# Patient Record
Sex: FEMALE | Race: WHITE | NOT HISPANIC OR LATINO | URBAN - METROPOLITAN AREA
[De-identification: names, ages, dates, MRNs, and addresses within clinical notes are randomized per-mention and may not be internally consistent; named-entity substitution may affect disease eponyms.]

---
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
## 2022-03-21 ENCOUNTER — TELEPHONE (OUTPATIENT)
Dept: FAMILY MEDICINE | Facility: CLINIC | Age: 22
End: 2022-03-21

## 2022-03-21 ENCOUNTER — OFFICE VISIT (OUTPATIENT)
Dept: FAMILY MEDICINE | Facility: CLINIC | Age: 22
End: 2022-03-21
Payer: COMMERCIAL

## 2022-03-21 VITALS
DIASTOLIC BLOOD PRESSURE: 60 MMHG | WEIGHT: 118.5 LBS | BODY MASS INDEX: 18.84 KG/M2 | SYSTOLIC BLOOD PRESSURE: 100 MMHG | HEART RATE: 82 BPM | OXYGEN SATURATION: 97 %

## 2022-03-21 DIAGNOSIS — Z11.1 SCREENING EXAMINATION FOR PULMONARY TUBERCULOSIS: Primary | ICD-10-CM

## 2022-03-21 DIAGNOSIS — F41.9 ANXIETY: ICD-10-CM

## 2022-03-21 PROCEDURE — 99213 OFFICE O/P EST LOW 20 MIN: CPT | Performed by: STUDENT IN AN ORGANIZED HEALTH CARE EDUCATION/TRAINING PROGRAM

## 2022-03-21 PROCEDURE — 86580 TB INTRADERMAL TEST: CPT | Performed by: STUDENT IN AN ORGANIZED HEALTH CARE EDUCATION/TRAINING PROGRAM

## 2022-03-21 RX ORDER — VENLAFAXINE HYDROCHLORIDE 75 MG/1
75 CAPSULE, EXTENDED RELEASE ORAL DAILY
Qty: 90 CAPSULE | Refills: 3 | Status: SHIPPED | OUTPATIENT
Start: 2022-03-21 | End: 2022-10-07

## 2022-03-21 NOTE — PROGRESS NOTES
Assessment and Plan     21-year-old female with possible history of anxiety who presents with request for TB screening test for work and needing a refill on her venlafaxine.    3. Anxiety  - venlafaxine (EFFEXOR-XR) 75 MG 24 hr capsule; Take 1 capsule (75 mg) by mouth daily  Dispense: 90 capsule; Refill: 3    4. Screening examination for pulmonary tuberculosis  - TB INTRADERMAL TEST    Follow up: PRN  Options for treatment and follow-up care were reviewed with the patient and/or guardian. Oksana Fraga and/or guardian engaged in the decision making process and verbalized understanding of the options discussed and agreed with the final plan.    Dr. 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  Needs a TB test before doing this.  She also is wondering if I can refill her venlafaxine she takes for anxiety.  States this is working well with for her and she has no concern for side effects         PMHX:     Patient Active Problem List   Diagnosis     Anxiety       Current Outpatient Medications   Medication Sig Dispense Refill     norgestim-eth estrad triphasic (ORTHO TRI-CYCLEN) 0.18/0.215/0.25 MG-35 MCG tablet Take 1 tablet by mouth daily 84 tablet 3     tretinoin (RETIN-A) 0.01 % external gel Apply topically At Bedtime 45 g 3     venlafaxine (EFFEXOR-XR) 75 MG 24 hr capsule Take 1 capsule (75 mg) by mouth daily 90 capsule 1       Social History     Tobacco Use     Smoking status: Never Smoker     Smokeless tobacco: Never Used   Substance Use Topics     Alcohol use: Never     Drug use: Never       Social History     Social History Narrative     Not on file       Allergies   Allergen Reactions     Amoxicillin      Sulfamethoxazole-Trimethoprim      Sulfamethoxazole-Trimethoprim [Sulfamethoxazole W/Trimethoprim] Unknown     Erythromycin  Unknown and Rash     Penicillins Unknown and Rash       No results found for this or any previous visit (from the past 24 hour(s)).         Review of Systems:    ROS: 10 point ROS neg other than the symptoms noted above in the HPI.         Physical Exam:     Vitals:    03/21/22 1709   BP: 100/60   BP Location: Left arm   Patient Position: Sitting   Cuff Size: Adult Regular   Pulse: 82   SpO2: 97%   Weight: 53.8 kg (118 lb 8 oz)     Body mass index is 18.84 kg/m .    General appearance: Alert, cooperative, no distress, appears stated age  Head: Normocephalic, atraumatic, without obvious abnormality  Eyes: Pupils equal round, reactive.  Conjunctiva clear.  Nose: Nares normal, no drainage.  Throat: Lips, mucosa, tongue normal mucosa pink and moist  Neck: Supple, symmetric, trachea midline

## 2022-03-21 NOTE — TELEPHONE ENCOUNTER
Patient had tb test today and needs to come back for tb reading on nurse schedule on Thursday morning or early afternoon before 5 pm. Patient was given number to clinic to call and get it scheduled as no time slots were open and I don't have capability to create a time slot. Please call if she hasn't made appointment and assist her.

## 2022-03-24 ENCOUNTER — ALLIED HEALTH/NURSE VISIT (OUTPATIENT)
Dept: FAMILY MEDICINE | Facility: CLINIC | Age: 22
End: 2022-03-24
Payer: COMMERCIAL

## 2022-03-24 DIAGNOSIS — Z11.1 SCREENING EXAMINATION FOR PULMONARY TUBERCULOSIS: Primary | ICD-10-CM

## 2022-03-24 LAB
PPDINDURATION: 0 MM (ref 0–4.99)
PPDREDNESS: NORMAL

## 2022-03-24 PROCEDURE — 99207 PR NO CHARGE LOS: CPT | Mod: 25

## 2022-04-02 ENCOUNTER — HEALTH MAINTENANCE LETTER (OUTPATIENT)
Age: 22
End: 2022-04-02

## 2022-04-11 DIAGNOSIS — Z11.1 SCREENING EXAMINATION FOR PULMONARY TUBERCULOSIS: Primary | ICD-10-CM

## 2022-04-15 ENCOUNTER — LAB (OUTPATIENT)
Dept: LAB | Facility: CLINIC | Age: 22
End: 2022-04-15
Payer: COMMERCIAL

## 2022-04-15 DIAGNOSIS — Z11.1 SCREENING EXAMINATION FOR PULMONARY TUBERCULOSIS: ICD-10-CM

## 2022-04-15 PROCEDURE — 36415 COLL VENOUS BLD VENIPUNCTURE: CPT

## 2022-04-15 PROCEDURE — 86481 TB AG RESPONSE T-CELL SUSP: CPT

## 2022-04-17 LAB
GAMMA INTERFERON BACKGROUND BLD IA-ACNC: 0.03 IU/ML
M TB IFN-G BLD-IMP: NEGATIVE
M TB IFN-G CD4+ BCKGRND COR BLD-ACNC: 9.97 IU/ML
MITOGEN IGNF BCKGRD COR BLD-ACNC: 0 IU/ML
MITOGEN IGNF BCKGRD COR BLD-ACNC: 0.06 IU/ML
QUANTIFERON MITOGEN: 10 IU/ML
QUANTIFERON NIL TUBE: 0.03 IU/ML
QUANTIFERON TB1 TUBE: 0.09 IU/ML
QUANTIFERON TB2 TUBE: 0.03

## 2022-07-08 ENCOUNTER — HOSPITAL ENCOUNTER (EMERGENCY)
Facility: HOSPITAL | Age: 22
Discharge: HOME | End: 2022-07-09
Attending: EMERGENCY MEDICINE
Payer: MEDICAID

## 2022-07-08 DIAGNOSIS — S60.221A CONTUSION OF RIGHT HAND, INITIAL ENCOUNTER: ICD-10-CM
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
## 2022-07-08 DIAGNOSIS — S40.021A CONTUSION OF RIGHT UPPER ARM, INITIAL ENCOUNTER: ICD-10-CM
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
## 2022-07-08 DIAGNOSIS — S20.212A CONTUSION OF RIB ON LEFT SIDE, INITIAL ENCOUNTER: ICD-10-CM

## 2022-07-08 PROCEDURE — 99284 EMERGENCY DEPT VISIT MOD MDM: CPT | Mod: 25

## 2022-07-08 RX ORDER — AMOXICILLIN AND CLAVULANATE POTASSIUM 875; 125 MG/1; MG/1
1 TABLET, FILM COATED ORAL
Status: COMPLETED | OUTPATIENT
Start: 2022-07-08 | End: 2022-07-09

## 2022-07-08 RX ORDER — HYDROCODONE BITARTRATE AND ACETAMINOPHEN 5; 325 MG/1; MG/1
1 TABLET ORAL
Status: COMPLETED | OUTPATIENT
Start: 2022-07-08 | End: 2022-07-09

## 2022-07-09 VITALS
HEART RATE: 65 BPM | RESPIRATION RATE: 18 BRPM | BODY MASS INDEX: 19.02 KG/M2 | HEIGHT: 60 IN | SYSTOLIC BLOOD PRESSURE: 122 MMHG | DIASTOLIC BLOOD PRESSURE: 72 MMHG | WEIGHT: 96.88 LBS | TEMPERATURE: 98 F | OXYGEN SATURATION: 100 %

## 2022-07-09 PROCEDURE — 25000003 PHARM REV CODE 250: Performed by: NURSE PRACTITIONER

## 2022-07-09 RX ORDER — DICLOFENAC SODIUM 50 MG/1
50 TABLET, DELAYED RELEASE ORAL 3 TIMES DAILY PRN
Qty: 15 TABLET | Refills: 0 | Status: SHIPPED | OUTPATIENT
Start: 2022-07-09

## 2022-07-09 RX ORDER — TRAMADOL HYDROCHLORIDE 50 MG/1
50 TABLET ORAL EVERY 8 HOURS PRN
Qty: 12 TABLET | Refills: 0 | Status: SHIPPED | OUTPATIENT
Start: 2022-07-09 | End: 2022-07-13

## 2022-07-09 RX ORDER — AMOXICILLIN AND CLAVULANATE POTASSIUM 875; 125 MG/1; MG/1
1 TABLET, FILM COATED ORAL 2 TIMES DAILY
Qty: 14 TABLET | Refills: 0 | Status: SHIPPED | OUTPATIENT
Start: 2022-07-09

## 2022-07-09 RX ADMIN — HYDROCODONE BITARTRATE AND ACETAMINOPHEN 1 TABLET: 5; 325 TABLET ORAL at 12:07

## 2022-07-09 RX ADMIN — AMOXICILLIN AND CLAVULANATE POTASSIUM 1 TABLET: 875; 125 TABLET, FILM COATED ORAL at 12:07
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
## 2022-09-15 DIAGNOSIS — L70.0 ACNE VULGARIS: ICD-10-CM

## 2022-09-15 RX ORDER — TRETINOIN 0.1 MG/G
GEL TOPICAL
Qty: 45 G | Refills: 3 | Status: SHIPPED | OUTPATIENT
Start: 2022-09-15 | End: 2022-10-07

## 2022-09-15 NOTE — TELEPHONE ENCOUNTER
"Last Written Prescription Date:  11/2/21  Last Fill Quantity: 45 g,  # refills: 3   Last office visit provider:  3/21/22     Requested Prescriptions   Pending Prescriptions Disp Refills     tretinoin (RETIN-A) 0.01 % external gel [Pharmacy Med Name: TRETINOIN 0.01% GEL 45GM] 45 g 3     Sig: APPLY TOPICALLY TO THE AFFECTED AREA AT BEDTIME       Topical Acne Medications Protocol Passed - 9/15/2022 12:52 AM        Passed - Patient is 12 years of age or older        Passed - Recent (12 mo) or future (30 days) visit within the authorizing provider's specialty     Patient has had an office visit with the authorizing provider or a provider within the authorizing providers department within the previous 12 mos or has a future within next 30 days. See \"Patient Info\" tab in inbasket, or \"Choose Columns\" in Meds & Orders section of the refill encounter.              Passed - Medication is active on med list             Juliane Santana RN 09/15/22 1:54 PM  "

## 2022-10-01 ENCOUNTER — HEALTH MAINTENANCE LETTER (OUTPATIENT)
Age: 22
End: 2022-10-01

## 2022-10-07 ENCOUNTER — OFFICE VISIT (OUTPATIENT)
Dept: FAMILY MEDICINE | Facility: CLINIC | Age: 22
End: 2022-10-07
Payer: COMMERCIAL

## 2022-10-07 VITALS
DIASTOLIC BLOOD PRESSURE: 66 MMHG | OXYGEN SATURATION: 99 % | HEART RATE: 97 BPM | BODY MASS INDEX: 18.65 KG/M2 | SYSTOLIC BLOOD PRESSURE: 100 MMHG | RESPIRATION RATE: 20 BRPM | WEIGHT: 117.3 LBS

## 2022-10-07 DIAGNOSIS — F41.9 ANXIETY: Primary | ICD-10-CM

## 2022-10-07 DIAGNOSIS — Z11.3 SCREEN FOR STD (SEXUALLY TRANSMITTED DISEASE): ICD-10-CM

## 2022-10-07 DIAGNOSIS — L70.0 ACNE VULGARIS: ICD-10-CM

## 2022-10-07 DIAGNOSIS — Z30.09 BIRTH CONTROL COUNSELING: ICD-10-CM

## 2022-10-07 DIAGNOSIS — Z11.59 NEED FOR HEPATITIS C SCREENING TEST: ICD-10-CM

## 2022-10-07 DIAGNOSIS — Z11.4 SCREENING FOR HIV (HUMAN IMMUNODEFICIENCY VIRUS): ICD-10-CM

## 2022-10-07 PROCEDURE — 99214 OFFICE O/P EST MOD 30 MIN: CPT | Performed by: NURSE PRACTITIONER

## 2022-10-07 PROCEDURE — 36415 COLL VENOUS BLD VENIPUNCTURE: CPT | Performed by: NURSE PRACTITIONER

## 2022-10-07 PROCEDURE — 87491 CHLMYD TRACH DNA AMP PROBE: CPT | Performed by: NURSE PRACTITIONER

## 2022-10-07 PROCEDURE — 87591 N.GONORRHOEAE DNA AMP PROB: CPT | Performed by: NURSE PRACTITIONER

## 2022-10-07 PROCEDURE — 87389 HIV-1 AG W/HIV-1&-2 AB AG IA: CPT | Performed by: NURSE PRACTITIONER

## 2022-10-07 PROCEDURE — 86803 HEPATITIS C AB TEST: CPT | Performed by: NURSE PRACTITIONER

## 2022-10-07 RX ORDER — VENLAFAXINE HYDROCHLORIDE 75 MG/1
75 CAPSULE, EXTENDED RELEASE ORAL DAILY
Qty: 90 CAPSULE | Refills: 3 | Status: SHIPPED | OUTPATIENT
Start: 2022-10-07 | End: 2023-04-03

## 2022-10-07 RX ORDER — TRETINOIN 0.1 MG/G
GEL TOPICAL
Qty: 45 G | Refills: 3 | Status: SHIPPED | OUTPATIENT
Start: 2022-10-07 | End: 2023-07-10

## 2022-10-07 RX ORDER — NORGESTIMATE AND ETHINYL ESTRADIOL 7DAYSX3 28
1 KIT ORAL DAILY
Qty: 84 TABLET | Refills: 3 | Status: SHIPPED | OUTPATIENT
Start: 2022-10-07 | End: 2023-09-18

## 2022-10-07 ASSESSMENT — PAIN SCALES - GENERAL: PAINLEVEL: NO PAIN (0)

## 2022-10-07 NOTE — PROGRESS NOTES
Assessment and Plan:     Anxiety  This is controlled.  She continues venlafaxine.  - venlafaxine (EFFEXOR XR) 75 MG 24 hr capsule  Dispense: 90 capsule; Refill: 3    Acne vulgaris  She continues Retin-A for acne.  - tretinoin (RETIN-A) 0.01 % external gel  Dispense: 45 g; Refill: 3    Birth control counseling  Renewed OCP.  Educated on its indications and side effects including increased risk of blood clots.  We discussed the risk of increased blood clots with smoking.  I encourage smoking cessation.  Offered smoking cessation treatment options, but she declines.  - norgestim-eth estrad triphasic (ORTHO TRI-CYCLEN) 0.18/0.215/0.25 MG-35 MCG tablet  Dispense: 84 tablet; Refill: 3    Screen for STD (sexually transmitted disease)  Discussed safe sex practices.  Will notify patient of results.  - Chlamydia trachomatis PCR - Clinic Collect  - Neisseria gonorrhoeae PCR - Clinic Collect    Screening for HIV (human immunodeficiency virus)  - HIV Antigen Antibody Combo  - HIV Antigen Antibody Combo    Need for hepatitis C screening test  - Hepatitis C Screen Reflex to HCV RNA Quant and Genotype  - Hepatitis C Screen Reflex to HCV RNA Quant and Genotype    She is due for fasting physical with Pap in May.    Subjective:     Oksana is a 22 year old female presenting to the clinic for medication management.  Patient is taking venlafaxine XR 75 mg daily for anxiety.  She is tolerating the medication without any side effects.  Patient used to experience anxiety prior to presentations and social events.  She felt shaky and had difficulty breathing.  She felt lightheaded.  Overall, she feels as her symptoms are managed with the venlafaxine.  She denies thoughts of suicide.  Patient uses Retin-A for acne.  She requests renewal of oral contraceptive.  She is single and is not currently sexually active.  She has no concerns for STDs.  She is due for her menstrual period today.  She denies any personal family history of blood clots.   She does vape and occasionally smokes cigarettes.    Reviewof Systems: A complete 14 point review of systems was obtained and is negative or as stated in the history of present illness.    Social History     Socioeconomic History     Marital status: Single     Spouse name: Not on file     Number of children: Not on file     Years of education: Not on file     Highest education level: Not on file   Occupational History     Not on file   Tobacco Use     Smoking status: Current Some Day Smoker     Smokeless tobacco: Never Used   Vaping Use     Vaping Use: Every day     Substances: Nicotine     Devices: Disposable   Substance and Sexual Activity     Alcohol use: Yes     Comment: rare     Drug use: Yes     Types: Marijuana     Sexual activity: Never     Comment: single    Other Topics Concern     Not on file   Social History Narrative     Not on file     Social Determinants of Health     Financial Resource Strain: Not on file   Food Insecurity: Not on file   Transportation Needs: Not on file   Physical Activity: Not on file   Stress: Not on file   Social Connections: Not on file   Intimate Partner Violence: Not on file   Housing Stability: Not on file       Active Ambulatory Problems     Diagnosis Date Noted     Anxiety 10/22/2019     Resolved Ambulatory Problems     Diagnosis Date Noted     No Resolved Ambulatory Problems     No Additional Past Medical History       Family History   Problem Relation Age of Onset     Anemia Mother      No Known Problems Father      Heart Disease Maternal Grandfather        Objective:     /66 (BP Location: Right arm, Patient Position: Sitting)   Pulse 97   Resp 20   Wt 53.2 kg (117 lb 4.8 oz)   SpO2 99%   BMI 18.65 kg/m      Patient is alert, in no obvious distress.   Skin: Warm, dry.    Lungs:  Clear to auscultation. Respirations even and unlabored.  No wheezing or rales noted.   Heart:  Regular rate and rhythm.  No murmurs, S3, S4, gallops, or rubs.    Abdomen: Soft,  nontender.  No organomegaly. Bowel sounds normoactive. No guarding or masses noted.

## 2022-10-08 LAB
C TRACH DNA SPEC QL NAA+PROBE: NEGATIVE
N GONORRHOEA DNA SPEC QL NAA+PROBE: NEGATIVE

## 2022-10-09 LAB
HCV AB SERPL QL IA: NONREACTIVE
HIV 1+2 AB+HIV1 P24 AG SERPL QL IA: NONREACTIVE

## 2023-03-21 DIAGNOSIS — F41.9 ANXIETY: ICD-10-CM

## 2023-03-21 RX ORDER — VENLAFAXINE HYDROCHLORIDE 75 MG/1
75 CAPSULE, EXTENDED RELEASE ORAL DAILY
Qty: 90 CAPSULE | Refills: 3 | OUTPATIENT
Start: 2023-03-21

## 2023-03-24 ENCOUNTER — MYC MEDICAL ADVICE (OUTPATIENT)
Dept: FAMILY MEDICINE | Facility: CLINIC | Age: 23
End: 2023-03-24
Payer: COMMERCIAL

## 2023-03-24 ASSESSMENT — PATIENT HEALTH QUESTIONNAIRE - PHQ9
SUM OF ALL RESPONSES TO PHQ QUESTIONS 1-9: 12
SUM OF ALL RESPONSES TO PHQ QUESTIONS 1-9: 12
10. IF YOU CHECKED OFF ANY PROBLEMS, HOW DIFFICULT HAVE THESE PROBLEMS MADE IT FOR YOU TO DO YOUR WORK, TAKE CARE OF THINGS AT HOME, OR GET ALONG WITH OTHER PEOPLE: SOMEWHAT DIFFICULT

## 2023-03-24 NOTE — TELEPHONE ENCOUNTER
"Left message to return call. Please route to RN queue to triage when pt returns call.    Patient noted: \"Several days\" to question #9: Thoughts that you would be better off dead, or of hurting yourself in some way.    BETSY HesterN, RN  [facility]    "

## 2023-03-27 NOTE — TELEPHONE ENCOUNTER
"Writer left patient message to return call to clinic.  Please route to nurse queue if patient returns call to be triaged due to PHQ-9 answer     Patient noted: \"Several days\" to question #9: Thoughts that you would be better off dead, or of hurting yourself in some way.    BETSY CamarenaN, RN  [facility]    "

## 2023-03-28 DIAGNOSIS — F41.9 ANXIETY: ICD-10-CM

## 2023-03-28 NOTE — TELEPHONE ENCOUNTER
Patient states she has an appointment with first available provider 3/31, writer assisted patient with scheduling an appt with PCP on 4/3/23 per patient's request.     Patient states she is okay and would like to discuss PHQ-9 answers at her appointment along with discussing her medications for depression/anxiety at this appointment.     Writer also sending patient mental health crisis information via [employer] and educated patient if she has thoughts of harming herself to call clinic, go to ER, or call one of the resources in her [employer] message.    Patient agreed with plan and denies further questions at this time,     BETSY CamarenaN, RN  [facility]

## 2023-03-29 RX ORDER — VENLAFAXINE HYDROCHLORIDE 75 MG/1
75 CAPSULE, EXTENDED RELEASE ORAL DAILY
Qty: 90 CAPSULE | Refills: 3 | OUTPATIENT
Start: 2023-03-29

## 2023-04-03 ENCOUNTER — OFFICE VISIT (OUTPATIENT)
Dept: FAMILY MEDICINE | Facility: CLINIC | Age: 23
End: 2023-04-03
Payer: COMMERCIAL

## 2023-04-03 VITALS
TEMPERATURE: 98 F | SYSTOLIC BLOOD PRESSURE: 113 MMHG | DIASTOLIC BLOOD PRESSURE: 73 MMHG | HEIGHT: 66 IN | HEART RATE: 91 BPM | BODY MASS INDEX: 19.88 KG/M2 | WEIGHT: 123.7 LBS | OXYGEN SATURATION: 95 % | RESPIRATION RATE: 19 BRPM

## 2023-04-03 DIAGNOSIS — F33.1 MODERATE EPISODE OF RECURRENT MAJOR DEPRESSIVE DISORDER (H): ICD-10-CM

## 2023-04-03 DIAGNOSIS — F41.9 ANXIETY: ICD-10-CM

## 2023-04-03 PROCEDURE — 99214 OFFICE O/P EST MOD 30 MIN: CPT | Performed by: NURSE PRACTITIONER

## 2023-04-03 RX ORDER — VENLAFAXINE HYDROCHLORIDE 75 MG/1
75 CAPSULE, EXTENDED RELEASE ORAL DAILY
Qty: 90 CAPSULE | Refills: 0 | Status: SHIPPED | OUTPATIENT
Start: 2023-04-03 | End: 2023-07-10

## 2023-04-03 RX ORDER — VENLAFAXINE HYDROCHLORIDE 37.5 MG/1
37.5 CAPSULE, EXTENDED RELEASE ORAL DAILY
Qty: 90 CAPSULE | Refills: 0 | Status: SHIPPED | OUTPATIENT
Start: 2023-04-03 | End: 2023-06-26

## 2023-04-03 ASSESSMENT — ANXIETY QUESTIONNAIRES
2. NOT BEING ABLE TO STOP OR CONTROL WORRYING: MORE THAN HALF THE DAYS
7. FEELING AFRAID AS IF SOMETHING AWFUL MIGHT HAPPEN: NOT AT ALL
IF YOU CHECKED OFF ANY PROBLEMS ON THIS QUESTIONNAIRE, HOW DIFFICULT HAVE THESE PROBLEMS MADE IT FOR YOU TO DO YOUR WORK, TAKE CARE OF THINGS AT HOME, OR GET ALONG WITH OTHER PEOPLE: SOMEWHAT DIFFICULT
GAD7 TOTAL SCORE: 10
6. BECOMING EASILY ANNOYED OR IRRITABLE: MORE THAN HALF THE DAYS
3. WORRYING TOO MUCH ABOUT DIFFERENT THINGS: MORE THAN HALF THE DAYS
4. TROUBLE RELAXING: MORE THAN HALF THE DAYS
7. FEELING AFRAID AS IF SOMETHING AWFUL MIGHT HAPPEN: NOT AT ALL
1. FEELING NERVOUS, ANXIOUS, OR ON EDGE: SEVERAL DAYS
8. IF YOU CHECKED OFF ANY PROBLEMS, HOW DIFFICULT HAVE THESE MADE IT FOR YOU TO DO YOUR WORK, TAKE CARE OF THINGS AT HOME, OR GET ALONG WITH OTHER PEOPLE?: SOMEWHAT DIFFICULT
GAD7 TOTAL SCORE: 10
5. BEING SO RESTLESS THAT IT IS HARD TO SIT STILL: SEVERAL DAYS
GAD7 TOTAL SCORE: 10

## 2023-04-03 ASSESSMENT — PAIN SCALES - GENERAL: PAINLEVEL: NO PAIN (0)

## 2023-04-03 NOTE — PROGRESS NOTES
Assessment and Plan:       ICD-10-CM    1. Anxiety  F41.9 venlafaxine (EFFEXOR XR) 37.5 MG 24 hr capsule     venlafaxine (EFFEXOR XR) 75 MG 24 hr capsule      2. Moderate episode of recurrent major depressive disorder (H)  F33.1         PHQ-9 score is 12.  Symptoms are uncontrolled.  Discussed treatment options.  Will increase venlafaxine to 112.5 mg daily.  Educated on its indications and side effects.  She is to follow-up in 1 month for medication management or sooner with any further concerns.  She is content with the plan.      Subjective:     Oksana is a 22 year old female presenting to the clinic for medication management.  Patient is taking venlafaxine XR 75 mg daily.  She has been tolerating the medication well.  She feels as though her mood continues to fluctuate.  She occasionally has thoughts of suicide, does not have a plan.  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  She will return back to school in the fall for vet tech degree.  When she is anxious, she feels shaky and has difficulty breathing.  She feels well supported.    Reviewof Systems: A complete 14 point review of systems was obtained and is negative or as stated in the history of present illness.    Social History     Socioeconomic History     Marital status: Single     Spouse name: Not on file     Number of children: Not on file     Years of education: Not on file     Highest education level: Not on file   Occupational History     Not on file   Tobacco Use     Smoking status: Some Days     Smokeless tobacco: Never   Vaping Use     Vaping status: Every Day     Substances: Nicotine     Devices: Disposable   Substance and Sexual Activity     Alcohol use: Yes     Comment: rare     Drug use: Yes     Types: Marijuana     Sexual activity: Never     Comment: single    Other Topics Concern     Not on file   Social History Narrative     Not on file     Social Determinants of Health  "    Financial Resource Strain: Not on file   Food Insecurity: Not on file   Transportation Needs: Not on file   Physical Activity: Not on file   Stress: Not on file   Social Connections: Not on file   Intimate Partner Violence: Not on file   Housing Stability: Not on file       Active Ambulatory Problems     Diagnosis Date Noted     Anxiety 10/22/2019     Resolved Ambulatory Problems     Diagnosis Date Noted     No Resolved Ambulatory Problems     No Additional Past Medical History       Family History   Problem Relation Age of Onset     Anemia Mother      No Known Problems Father      Heart Disease Maternal Grandfather        Objective:     /73 (BP Location: Right arm, Patient Position: Sitting, Cuff Size: Adult Regular)   Pulse 91   Temp 98  F (36.7  C) (Oral)   Resp 19   Ht 1.689 m (5' 6.5\")   Wt 56.1 kg (123 lb 11.2 oz)   LMP 04/03/2023   SpO2 95%   BMI 19.67 kg/m      Patient is alert, in no obvious distress.   Skin: Warm, dry.    Lungs:  Clear to auscultation. Respirations even and unlabored.  No wheezing or rales noted.   Heart:  Regular rate and rhythm.  No murmurs.              "

## 2023-04-25 ENCOUNTER — MYC MEDICAL ADVICE (OUTPATIENT)
Dept: FAMILY MEDICINE | Facility: CLINIC | Age: 23
End: 2023-04-25
Payer: COMMERCIAL

## 2023-04-26 NOTE — TELEPHONE ENCOUNTER
Please notify patient that she needs to sign a MARYCHUY before I can speak to her sister.  Thanks.

## 2023-05-13 ENCOUNTER — HEALTH MAINTENANCE LETTER (OUTPATIENT)
Age: 23
End: 2023-05-13

## 2023-06-25 DIAGNOSIS — F41.9 ANXIETY: ICD-10-CM

## 2023-06-26 RX ORDER — VENLAFAXINE HYDROCHLORIDE 37.5 MG/1
CAPSULE, EXTENDED RELEASE ORAL
Qty: 90 CAPSULE | Refills: 0 | Status: SHIPPED | OUTPATIENT
Start: 2023-06-26 | End: 2023-07-10

## 2023-06-26 NOTE — TELEPHONE ENCOUNTER
"Routing refill request to provider for review/approval because:  Labs not current:  Creatinine  Early fill requested    Last Written Prescription Date:  4/3/23  Last Fill Quantity: 90,  # refills: 0   Last office visit provider:  4/3/23     Requested Prescriptions   Pending Prescriptions Disp Refills     venlafaxine (EFFEXOR XR) 37.5 MG 24 hr capsule [Pharmacy Med Name: VENLAFAXINE ER 37.5MG CAPSULES] 90 capsule 0     Sig: TAKE ONE CAPSULE BY MOUTH EVERY DAY. TAKE WITH 75MG CAPSULE FOR A TOTAL DAILY DOSE .5 MG       Serotonin-Norepinephrine Reuptake Inhibitors  Failed - 6/25/2023 11:01 AM        Failed - Normal serum creatinine on file in past 12 months     No lab results found.    Ok to refill medication if creatinine is low          Passed - Blood pressure under 140/90 in past 12 months     BP Readings from Last 3 Encounters:   04/03/23 113/73   10/07/22 100/66   03/21/22 100/60                 Passed - Recent (12 mo) or future (30 days) visit within the authorizing provider's specialty     Patient has had an office visit with the authorizing provider or a provider within the authorizing providers department within the previous 12 mos or has a future within next 30 days. See \"Patient Info\" tab in inbasket, or \"Choose Columns\" in Meds & Orders section of the refill encounter.              Passed - Medication is active on med list        Passed - Patient is age 18 or older        Passed - No active pregnancy on record        Passed - No positive pregnancy test in past 12 months             Lashell Escalante RN 06/26/23 12:42 PM  "

## 2023-07-05 ENCOUNTER — NURSE TRIAGE (OUTPATIENT)
Dept: NURSING | Facility: CLINIC | Age: 23
End: 2023-07-05
Payer: COMMERCIAL

## 2023-07-05 ENCOUNTER — HOSPITAL ENCOUNTER (EMERGENCY)
Facility: CLINIC | Age: 23
Discharge: HOME | End: 2023-07-05
Attending: EMERGENCY MEDICINE | Admitting: EMERGENCY MEDICINE
Payer: COMMERCIAL

## 2023-07-05 VITALS
WEIGHT: 118 LBS | HEIGHT: 68 IN | SYSTOLIC BLOOD PRESSURE: 112 MMHG | OXYGEN SATURATION: 99 % | TEMPERATURE: 97.6 F | RESPIRATION RATE: 18 BRPM | BODY MASS INDEX: 17.88 KG/M2 | DIASTOLIC BLOOD PRESSURE: 75 MMHG | HEART RATE: 82 BPM

## 2023-07-05 DIAGNOSIS — F43.10 PTSD (POST-TRAUMATIC STRESS DISORDER): ICD-10-CM

## 2023-07-05 DIAGNOSIS — F33.1 MAJOR DEPRESSIVE DISORDER, RECURRENT EPISODE, MODERATE (H): ICD-10-CM

## 2023-07-05 DIAGNOSIS — R45.851 SUICIDAL IDEATION: ICD-10-CM

## 2023-07-05 DIAGNOSIS — F41.1 GAD (GENERALIZED ANXIETY DISORDER): ICD-10-CM

## 2023-07-05 PROCEDURE — 99284 EMERGENCY DEPT VISIT MOD MDM: CPT

## 2023-07-05 PROCEDURE — 99285 EMERGENCY DEPT VISIT HI MDM: CPT | Mod: 25

## 2023-07-05 PROCEDURE — 90791 PSYCH DIAGNOSTIC EVALUATION: CPT

## 2023-07-05 ASSESSMENT — COLUMBIA-SUICIDE SEVERITY RATING SCALE - C-SSRS
REASONS FOR IDEATION LIFETIME: COMPLETELY TO END OR STOP THE PAIN (YOU COULDN'T GO ON LIVING WITH THE PAIN OR HOW YOU WERE FEELING)
3. HAVE YOU BEEN THINKING ABOUT HOW YOU MIGHT KILL YOURSELF?: YES
5. HAVE YOU STARTED TO WORK OUT OR WORKED OUT THE DETAILS OF HOW TO KILL YOURSELF? DO YOU INTEND TO CARRY OUT THIS PLAN?: YES
4. HAVE YOU HAD THESE THOUGHTS AND HAD SOME INTENTION OF ACTING ON THEM?: YES
TOTAL  NUMBER OF INTERRUPTED ATTEMPTS LIFETIME: NO
5. HAVE YOU STARTED TO WORK OUT OR WORKED OUT THE DETAILS OF HOW TO KILL YOURSELF? DO YOU INTEND TO CARRY OUT THIS PLAN?: YES
1. HAVE YOU WISHED YOU WERE DEAD OR WISHED YOU COULD GO TO SLEEP AND NOT WAKE UP?: YES
REASONS FOR IDEATION PAST MONTH: COMPLETELY TO END OR STOP THE PAIN (YOU COULDN'T GO ON LIVING WITH THE PAIN OR HOW YOU WERE FEELING)
1. IN THE PAST MONTH, HAVE YOU WISHED YOU WERE DEAD OR WISHED YOU COULD GO TO SLEEP AND NOT WAKE UP?: YES
2. HAVE YOU ACTUALLY HAD ANY THOUGHTS OF KILLING YOURSELF?: YES
4. HAVE YOU HAD THESE THOUGHTS AND HAD SOME INTENTION OF ACTING ON THEM?: YES
ATTEMPT PAST THREE MONTHS: YES
2. HAVE YOU ACTUALLY HAD ANY THOUGHTS OF KILLING YOURSELF?: YES
ATTEMPT LIFETIME: YES
TOTAL  NUMBER OF ABORTED OR SELF INTERRUPTED ATTEMPTS LIFETIME: NO
6. HAVE YOU EVER DONE ANYTHING, STARTED TO DO ANYTHING, OR PREPARED TO DO ANYTHING TO END YOUR LIFE?: NO

## 2023-07-05 ASSESSMENT — ACTIVITIES OF DAILY LIVING (ADL)
ADLS_ACUITY_SCORE: 35
ADLS_ACUITY_SCORE: 35
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
## 2023-07-05 NOTE — ED PROVIDER NOTES
"  History     Chief Complaint:  Suicidal       HPI   Oksana Fraga is a 23 year old female with a history of anxiety who presents for evaluation of suicidal ideation. Oksana reports that she has been experiencing mental health issues since she was 16 years old, describing difficulties with anxiety and depression. She has never had a psychiatrist but has done therapy and states that she had bad experiences with it. Recently, Oksana has been having increasing amounts of depressive episodes and had a suicide attempt three weeks ago at which time she had taken Percocet in combination with alcohol. Oksana does still have suicidal thoughts, which she reports occurs when she is feeling depression. She denies any fever, cough, or diarrhea, but did have an episode of emesis but considered that to because of what she ate two days ago. Of note, Oksana reports that she lives with her parents.       Independent Historian:   None - Patient Only      Medications:  Trisprintec  Venlafaxine     Past Medical History:    Anxiety  Depression    Past Surgical History:    Tonsil & adenoidectomy     Physical Exam     Patient Vitals for the past 24 hrs:   BP Temp Temp src Pulse Resp SpO2 Height Weight   07/05/23 1904 112/75 97.6  F (36.4  C) -- 82 18 99 % 1.727 m (5' 8\") --   07/05/23 1900 116/77 98.2  F (36.8  C) Oral 96 18 97 % 1.727 m (5' 8\") 53.5 kg (118 lb)        Physical Exam  VS: Reviewed per above  HENT: Mucous membranes moist  EYES: sclera anicteric  CV: Rate as noted,  regular rhythm.   RESP: Effort normal. Breath sounds are normal bilaterally.  PSYCH: +SI, tearful affect  NEURO: Alert, moving all extremities  MSK: No deformity of the extremities  SKIN: Warm and dry    Emergency Department Course     Procedures   none    Emergency Department Course & Assessments:    PSS-3    Date and Time Over the past 2 weeks have you felt down, depressed, or hopeless? Over the past 2 weeks have you had thoughts of killing yourself? Have you " ever attempted to kill yourself? When did this last happen? User   07/05/23 1900 yes yes yes within the last month (but not today) SH      C-SSRS ([place]kins)    Date and Time Q1 Wished to be Dead (Past Month) Q2 Suicidal Thoughts (Past Month) Q3 Suicidal Thought Method Q4 Suicidal Intent without Specific Plan Q5 Suicide Intent with Specific Plan Q6 Suicide Behavior (Lifetime) Within the Past 3 Months? RETIRED: Level of Risk per Screen Screening Not Complete User   07/05/23 1924 yes yes yes yes yes yes -- -- -- AS   07/05/23 1900 yes yes yes yes yes yes -- -- --                 Interventions:  Medications - No data to display     Assessments:  1847  I obtained history and examined the patient as noted above. In shared decision making, [facility]     Independent Interpretation (X-rays, CTs, rhythm strip):  None    Consultations/Discussion of Management or Tests:  None        Social Determinants of Health affecting care:   None    Disposition:  [facility]     Impression & Plan    CMS Diagnoses: None    Medical Decision Making:  Patient presents to the ER for evaluation of suicidal ideation, worsening depression.  Vital signs reassuring.  No emergent medical condition detected on history or exam.  Patient seems remorseful regarding her recent suicidal gesture and is eager to seek help.  Patient was expedited to empath unit for further psychiatric evaluation.    Diagnosis:    ICD-10-CM    1. Suicidal ideation  R45.851         Scribe Disclosure:  ILarissa, am serving as a scribe  at 8:10 PM on 7/5/2023 to document services personally performed by Darshan Diggs MD based on my observations and the provider's statements to me.    Scribe Disclosure:  IEsperanza, am serving as a scribe at 7:32 PM on 7/5/2023 to document services personally performed by Darshan Diggs MD based on my observations and the provider's statements to  me.      Darshan Diggs MD  07/05/23 6549

## 2023-07-05 NOTE — TELEPHONE ENCOUNTER
Nurse Triage SBAR    Is this a 2nd Level Triage? No    Situation/Background: Sister, Nancy, is calling with concern of patient trying to commit suicide several weeks ago. Patient is upstairs at the time of the call. Per the caller, the patient states she has no plan to harm herself today, family is with her at the time of the call, the patient has agreed to talk with a mental health provider. Triage offered, declined by caller, stating that family is with the patient right now, they are requesting information on where they can bring the patient for evaluation.     No CTC on file. Advised caller that no personal or health information would be able to be shared.                     Recommendation: Per disposition, Information provided. Recommended evaluation at [facility] for evaluation/possible admission to EMPATH unit. Questions answered. Advised caller to call back with any new or worsening symptoms. Caller verbalized understanding and agrees with plan.    Protocol Recommended Disposition: Telephone advice    Merlene Galicia RN on 7/5/2023 at 4:09 PM  [facility] Nurse [facility]    Reason for Disposition    General information question, no triage required and triager able to answer question    Protocols used: INFORMATION ONLY CALL - [facility]

## 2023-07-06 NOTE — ED PROVIDER NOTES
"EmPATH Unit - Psychiatric Consultation  [facility] Emergency Department    Oksana Fraga MRN: [id number]   Age: 23 year old YOB: 2000     History     Chief Complaint   Patient presents with     Suicidal     HPI  Oksana Fraga is a 23 year old female with history notable for depression, anxiety, and trauma who presented to the emergency department with worsening suicidal ideation in the context of relationship difficulties. Oksana reports that she attempted suicide three weeks ago by drinking tequila and taking percocet. She recently disclosed this to her family and they brought her to the emergency department. In the emergency department, Oksana was determined to be medically stable and transferred to the EmPATH unit for psychiatric assessment.  Record review indicates no previous mental health history. She has seen her PCP for ongoing psychiatric medications. They have currently been in the emergency department for 3 hours.     On examination today, Oksana initially asks if she can go home. She denies all current suicidal ideation and notes that she came here because her family wanted her too. She has had chronic suicidal thoughts \"on and off since I was 16 years old.\" She notes that she has \"2 good months and then a bad month.\" During her bad months, she has increased suicidal thoughts and is impulsive. She tries to keep herself busy during her bad months by working her job as a PCA and as a . She endorses several past suicide attempts by overdosing. She denies current suicidal thoughts and denies psychotic thought. She sees her PCP for ongoing medication management and takes venlafaxine 112.5mg daily. She notes this was recently increased from 75mg 3 months ago. She feels numb and is unsure if this medication is helpful for her depression. She thinks her anxiety has decreased since increasing her dose. She has tried several other medications in the past and reports that \"nothing works.\" " "During her \"bad periods\" she feels hopeless, tired, \"sleeps too much\", and is tearful. She tries to \"block things out.\" She reports that her parents  when she was a child and that this was very difficult to go through. She denies any periods of britany. She has previously tried zoloft, prozac, and lexapro. She wants to see a doctor or psychiatrist to do a more thorough assessment as she feels that she's \"undiagnosed BPD.\" She notes that her sister is in medical school and agrees that she likely has BPD. She notes a history of impulsive decisions, self-harm, and unstable relationships. She feels like she is \"the most stable right now\" that she has been in several years. She does not want to change her current medications as she want's to see a  Psychiatrist before changing her medications. Discussed engaging in therapy to built coping and distress tolerance skills. She notes that she has engaged in \"forced therapy\" in the past yet is willing to accept a referral. She would like to discharge to home. [facility] confirmed with collateral, mother, that all medications had been removed from the home. Mom plans to supervise effexor administration. Mom is ensuring that all firearms are removed from the house. Mom and Oksana note that family is very supportive. At this time, Oksana does not meet criteria to be held. Discharge to home with outpatient referrals and crisis resources.     Past Medical History  No past medical history on file.  Past Surgical History:   Procedure Laterality Date     HC REMOVE TONSILS/ADENOIDS,<13 Y/O      Description: Tonsillectomy With Adenoidectomy;  Recorded: 11/02/2011;     norgestim-eth estrad triphasic (ORTHO TRI-CYCLEN) 0.18/0.215/0.25 MG-35 MCG tablet  venlafaxine (EFFEXOR XR) 37.5 MG 24 hr capsule  venlafaxine (EFFEXOR XR) 75 MG 24 hr capsule  tretinoin (RETIN-A) 0.01 % external gel      Allergies   Allergen Reactions     Amoxicillin      Sulfamethoxazole-Trimethoprim      " "Sulfamethoxazole-Trimethoprim [Sulfamethoxazole-Trimethoprim] Unknown     Erythromycin Unknown and Rash     Penicillins Unknown and Rash     Family History  Family History   Problem Relation Age of Onset     Anemia Mother      No Known Problems Father      Heart Disease Maternal Grandfather      Social History   Social History     Tobacco Use     Smoking status: Some Days     Smokeless tobacco: Never   Vaping Use     Vaping Use: Every day     Substances: Nicotine     Devices: Disposable   Substance Use Topics     Alcohol use: Yes     Comment: rare     Drug use: Yes     Types: Marijuana      Past medical history, past surgical history, medications, allergies, family history, and social history were reviewed with the patient. No additional pertinent items.       Review of Systems  A medically appropriate review of systems was performed with pertinent positives and negatives noted in the HPI, and all other systems negative.    Physical Examination   BP: 116/77  Pulse: 96  Temp: 98.2  F (36.8  C)  Resp: 18  Height: 172.7 cm (5' 8\")  Weight: 53.5 kg (118 lb)  SpO2: 97 %    Physical Exam  General: Appears stated age.   Neuro: Alert and fully oriented. Extremities appear to demonstrate normal strength on visual inspection.   Integumentary/Skin: no rash visualized, normal color    Psychiatric Examination   Appearance: awake, alert and adequately groomed  Attitude:  somewhat cooperative  Eye Contact:  fair  Mood:  depressed  Affect:  mood congruent and intensity is flat  Speech:  clear, coherent and normal prosody  Psychomotor Behavior:  no evidence of tardive dyskinesia, dystonia, or tics and intact station, gait and muscle tone  Thought Process:  logical, linear and goal oriented  Associations:  no loose associations  Thought Content:  no evidence of suicidal ideation or homicidal ideation, no evidence of psychotic thought, no auditory hallucinations present and no visual hallucinations present  Insight:  fair  Judgement:  " fair  Oriented to:  time, person, and place  Attention Span and Concentration:  fair  Recent and Remote Memory:  fair  Language: able to name/identify objects without impairment  Fund of Knowledge: intact with awareness of current and past events    ED Course        Labs Ordered and Resulted from Time of ED Arrival to Time of ED Departure - No data to display    Assessments & Plan (with Medical Decision Making)   Patient presenting with increasing suicidal ideation in the context of increasing social stressors. Oksana notes a recent suicide attempt several weeks ago by combining ETOH and percocet. Oksana disclosed this attempt to family who brought her to [facility] for assessment.Oksana follows with her PCP for ongoing med management, she declined medication changes. Treatment plan focused on referrals for outpatient services and crisis resources. Could consider additional dose optimization of venlafaxine and or augmentation with mirtazapine in the future. Nursing notes reviewed noting no acute issues.     I have reviewed the assessment completed by the [facility].     Preliminary diagnosis:    ICD-10-CM    1. Suicidal ideation  R45.851       2. Major depressive disorder, recurrent episode, moderate (H)  F33.1       3. DIONNA (generalized anxiety disorder)  F41.1       4. PTSD (post-traumatic stress disorder)  F43.10            Treatment Plan:  -Continue current medications, venlafaxine 112.5mg daily targeting depression and anxiety. Consider further dose optimization with outpatient provider in the future. Could consider addition of mirtazapine in only partial response with venlafaxine achieved.  -Medication education provided this visit including but not limited to: Rationale for medication, importance of medication adherence, medication interactions, common medication side effects, benefits of medications.  -Individual psychotherapy and outpatient psychiatric care recommended for additional support and ongoing development of  nonpharmacologic coping skills and strategies.    -Problem focused supportive therapy and education provided today related to patient's current and acute stressors, symptoms, and diagnoses.  -Discharge to home with outpatient referrals and crisis resources.     --  HAYDEN Vann CNP   [facility] EMERGENCY DEPT  EmPATH Unit     Yudelka Maria APRN CNP  07/05/23 0358

## 2023-07-06 NOTE — ED TRIAGE NOTES
"Presents to ED with sister for increased depression with suicidal thoughts. Pt states \"I have a lot of different ideas of how to do it\". Suicide attempt 3 weeks ago, ingested alcohol and percocet. Pt tearful, cooperative.      Triage Assessment     Row Name 07/05/23 6009       Triage Assessment (Adult)    Airway WDL WDL       Respiratory WDL    Respiratory WDL WDL       Skin Circulation/Temperature WDL    Skin Circulation/Temperature WDL WDL       Cardiac WDL    Cardiac WDL WDL       Peripheral/Neurovascular WDL    Peripheral Neurovascular WDL WDL       Cognitive/Neuro/Behavioral WDL    Cognitive/Neuro/Behavioral WDL WDL              "

## 2023-07-06 NOTE — DISCHARGE INSTRUCTIONS
You were scheduled the following appointments:    Cristy Gerber  PhD  [facility], [facility]  [address], [address]   [phone]   7/13/2023 1:00 pm     Therapy - Initial (In-Person)    Psychiatry:    Janeil Schoenbauer  MSN  CNP,RN     [facility]  [address] [facility] [address] [phone] 7/21/2023 1:00 pm     Medication Mgmt - Initial (In-Person)    Patient Instructions    Please have intake paperwork completed at least 2 days prior to your appointment.If you are doing a tele-health appointment, the link is [email or web address] from there you will be able to select the provider you are meeting with and it will direct you to your appointment.    It is your responsibility to contact your insurance company directly to verify coverage, eligibility, payment, and benefit information for any appointments or referrals listed above. [facility] maintains an extensive network of licensed behavioral health providers to connect patients with the services they need. We do not charge providers a fee to participate in our referral network. We match patients with providers based on a patient's specific treatment needs, insurance coverage, and location. Our first effort will be to refer you to a provider within your care system, and will utilize providers outside your care system as needed    Aftercare Plan    Follow up with established providers and supports as scheduled. Continue taking medications as prescribed. Abstain from drugs and alcohol. Utilize your [facility] mental health crisis team as needed. They are available 24/7. Contact information is listed below.     If I am feeling unsafe or I am in a crisis, I will:   Contact my established care providers   Call the National Suicide Prevention Lifeline: 475.484.4374   Go to the nearest emergency room   Call 006     Warning signs that I or other people might notice when a crisis is developing for me: changes to sleep, appetite or mood,  increased anger, agitation or irritability, feeling depressed or hopeless, spending more time alone or talking less, increased crying, decreased productivity, seeing or hearing things that aren't there, thoughts of not wanting to live anymore or of actually killing myself, thoughts of hurting others, being quiet and negative    Things I am able to do on my own to cope or help me feel better: watching a favorite tv show or movie, listening to music I enjoy, going outside and breathing fresh air, going for a walk or exercising, taking a shower or bath, a cold or hot beverage, a healthy snack, drawing/coloring/painting, journaling, singing or dancing, deep breathing     I can try practicing square breathing when I begin to feel anxious - inhale through the nose for the count of 4 and the first line on the square. Exhale through the mouth for the count of 4 for the second line of the square. Repeat to complete the square. Repeat the square as many times as needed.    I can also use my five senses to practice mindfulness and grounding. What are five things I can see, four things I can hear, three things I can feel, two things I can smell, and one thing I can taste.     Things that I am able to do with others to cope or help me feel better: sometimes just talking or spending time with someone else, sharing a meal or having coffee, watching a movie or playing a game, going for a walk or exercising    I can also use community resources including mental health hotlines, [facility] crisis teams, or apps.     Things I can use or do for distraction: movies/tv, music, reading, games, drawing/coloring/painting or other art, essential oils, exercise, cleaning/organizing, puzzles, crossword puzzles, word search, Sudoku       I can also download a meditation or relaxation radha, like Calm, Headspace, or Insight Timer (all three offer a free version)    A great website resource is Change to Chill with active coping skills    Changes I can  "make to support my mental health and wellness: Attend scheduled mental health therapy and psychiatric appointments. Take my medications as prescribed. Maintain a daily schedule/routine. Abstain from all mood altering substances, including drugs, alcohol, or medications not currently prescribed to me. Implement a self-care routine.      People in my life that I can ask for help: friends or family, trusted teachers/staff/colleagues, trusted members of my community or place of Jehovah's witness, mental health crisis lines, or 911    Your [facility] has a mental health crisis team you can call 24/7: [facility], [phone]    Other things that are important when I m in crisis: to remember that the feelings I am having right now are temporary, and it won't feel like this forever, and that it is okay and important to ask for help, asking my family for support    Crisis Lines  Crisis Text Line  Text 616865  You will be connected with a trained live crisis counselor to provide support.    Por espanol, texto  MARTITA a 619299 o texto a 442-AYUDAME en WhatsApp    National Hope Line  1.800.SUICIDE [1388410]      Community Resources  Fast Tracker  Linking people to mental health and substance use disorder resources  fasttrackermn.org     [place] Mental Health Warm Line  Peer to peer support  Monday thru Saturday, 12 pm to 10 pm  038.451.4138 or 2.280.787.8733  Text \"Support\" to 84852    National Sarles on Mental Illness (MARVIN)  824.024.8679 or 1.888.MARVIN.HELPS      Mental Health Apps  My3  https://myYodlepp.org/    VirtualHopeBox  https://[employer].org/apps/virtual-hope-box/      Additional Information  Today you were seen by a licensed mental health professional through Triage and Transition services, [facility] ([facility])  for a crisis assessment in the Emergency Department at [facility].  It is recommended that you follow up with your established providers (psychiatrist, mental health therapist, " and/or primary care doctor - as relevant) as soon as possible. Coordinators from [facility] will be calling you in the next 24-48 hours to ensure that you have the resources you need.  You can also contact [facility] coordinators directly at [phone]. You may have been scheduled for or offered an appointment with a mental health provider. [facility] maintains an extensive network of licensed behavioral health providers to connect patients with the services they need.  We do not charge providers a fee to participate in our referral network.  We match patients with providers based on a patient's specific needs, insurance coverage, and location.  Our first effort will be to refer you to a provider within your care system, and will utilize providers outside your care system as needed.      We discussed DBT.  In the future if you change your mind here are some resources    Dialectical Behavior Therapy (DBT) is evidence based comprehensive treatment delivered via three modalities; individual therapy, group skills training, and telephone coaching by a team of DBT-trained providers. Team members meet 90 minutes per week as part of a DBT-specific consultation team. DBT treatment is based in cognitive, behavioral and dialectic principles and incorporates both clinical and rehabilitative interventions. The targeted treatment group is individuals for whom empirical evidence supports its effectiveness; individuals who are suicidal, engaging in self-harm behaviors, and/or diagnosed with a borderline personality disorder.    Adult DBT (only)  [facility] [address] [facility] [phone]  [facility] [address] [facility] [phone]  [facility] [address] [facility] [phone]    Adolescent OR Adult DBT services  [facility] [address] [place] [phone].  Also specializes in DD/TBI emotional regulation   [facility] (numerous locations,  adolescent DBT is only in [facility]) [phone]  [facility] [address] [facility] (numerous locations) [phone]  DBT & EMDR Specialist located in both [place] and [place] [phone]  [facility] [address] [phone]  [facility] [address] [phone]  [facility] [address] [facility] [phone]  [address] [place] [phone]  [facility] [address] [facility] [phone], also has groups specifically for eating disorders DBT, JAZMIN DBT, and LGBTQ DBT  [facility] [address] [phone]  [facility] (numerous locations) [phone]  [facility] [address] [facility] [phone]

## 2023-07-06 NOTE — CONSULTS
Diagnostic Evaluation Consultation  Crisis Assessment    Patient was assessed: In Person  Patient location: declocations: [facility]  Was a release of information signed: No. Reason: Nancy, sister      Referral Data and Chief Complaint  Oksana is a 23 year old, who uses she/her pronouns, and presents to the ED with family/friends. Patient is referred to the ED by family/friends. Patient is presenting to the ED for the following concerns: suicide attempt.      Informed Consent and Assessment Methods     Patient is her own guardian. Writer met with patient and explained the crisis assessment process, including applicable information disclosures and limits to confidentiality, assessed understanding of the process, and obtained consent to proceed with the assessment. Patient was observed to be able to participate in the assessment as evidenced by verbal understanding of the assessment process. Assessment methods included conducting a formal interview with patient, review of medical records, collaboration with medical staff, and obtaining relevant collateral information from family and community providers when available..     Over the course of this crisis assessment provided reassurance, offered validation, engaged patient in problem solving and disposition planning, provided psychoeducation and facilitated family communication. Patient's response to interventions was engaged     Summary of Patient Situation  Pt reports that she told her mom that she took a break from her boyfriend today due to increased mental health sx including depression.  During this conversation she relayed to her family that she had attempted suicide 3 weeks ago via overdose of 3 percocet and 3 shots of tequila.  Pt's family became concerned about such and brought her in for additional evaluation.     Pt reports a long hx of mental health concerns since childhood/young adolescence.  Pt reports sx of impulsiveness (employment, schooling and money),  "social anxiety with panic attacks, anger and irritability, unstable relationships, and dissociation.  Pt also endorses depression including hopelessness, worthlessness, sadness, low motivation/energy.  Pt acknowledges concerns of trauma from childhood.  Pt denies any psychosis, britany, delusional thinking or paranoia.  Pt presents as anxious but cooperative, alert and oriented.  Pt is appropriately tearful.    Pt reports chronic suicidal ideation since childhood.  Pt acknowledges the suicide attempt 3 weeks ago.  Pt reports on going suicidal ideation.  Pt reports previous suicide attempts via overdose at age 16, 17, 18, 21 all by overdose.  Pt has additionally considered other methods including hanging, buying a gun, and getting trazodone prescribed.  Pt reports that she wishes death would \"be easier\".  Pt reports that she has typically become scared post overdoses and worries about dying.  Pt reports that during her most recent attempt she attempted to throw up and became worried about her breathing.  Pt denies any self harm or homicidal ideation.  Pt presents as help seeking.    Brief Psychosocial History  Pt currently lives at home with her mom and her partner, whom she identifies as dad.  Pt's bio parents split when she younger, in which was a horrific divorce as they put the children in the middle and made traumatic accusations/statements.  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  Pt reports that she has a pending insurance job and or is thinking about nursing school as \"I haven't figured out my life yet\".      Significant Clinical History  Pt reports a previous dx of depression and anxiety.  Pt wonders if she should be dx with borderline personality disorder.    Pt has never been hospitalized for MH.  Pt has never attended IOP/PHP/DBT.  Pt denies any previous psychiatry.  Pt reports being on medications since 17 via " "PCP.  Pt reports being \"forced\" into therapy as a child.  Pt is currently taking Effexor via her PCP.     Pt denies any chemical health concerns.  Pt denies any medical concerns.     Collateral Information  The following information was received from Nancy whose relationship to the patient is sister. Information was obtained in person. Their phone number is  and they last had contact with patient on today.    What happened today: She reports today the family members disclosed to her that 3 weeks ago she attempted suicide via a combination of ETOH and pills.  She states that she was not assessed at the time due to family complications and a tendency to under react    What is different about patient's functioning: She states that they aren't very close but she is aware that she has been struggling with mood instability.  She does not currently have any mental health providers and has been hesitant to have any.  She believes she may be taking medications from the PCP.  She reports that their parents divorce was highly traumatic on the children as they were often put in the middle    Concern about alcohol/drug use: Yes THC    What do you think the patient needs: unknown, resources, therapy, medications    Has patient made comments about wanting to kill themselves/others:  No.  She was not aware of this attempt, but reports that there may have been other suicide attempts.    If d/c is recommended, can they take part in safety/aftercare planning: Yes .    Other information:      Risk Assessment  [place] Suicide Severity Rating Scale Full Clinical Version: 7/5/2023  Suicidal Ideation  1. Wish to be Dead (Lifetime): Yes  1. Wish to be Dead (Past 1 Month): Yes  2. Non-Specific Active Suicidal Thoughts (Lifetime): Yes  2. Non-Specific Active Suicidal Thoughts (Past 1 Month): Yes  3. Active Suicidal Ideation with any Methods (Not Plan) Without Intent to Act (Lifetime): Yes  3. Active Suicidal Ideation with any " Methods (Not Plan) Without Intent to Act (Past 1 Month): Yes  4. Active Suicidal Ideation with Some Intent to Act, Without Specific Plan (Lifetime): Yes  4. Active Suicidal Ideation with Some Intent to Act, Without Specific Plan (Past 1 Month): Yes  5. Active Suicidal Ideation with Specific Plan and Intent (Lifetime): Yes  5. Active Suicidal Ideation with Specific Plan and Intent (Past 1 Month): Yes  Intensity of Ideation  Most Severe Ideation Rating (Lifetime): 5  Most Severe Ideation Rating (Past 1 Month): 5  Frequency (Lifetime): Many times each day  Frequency (Past 1 Month): Many times each day  Duration (Lifetime): More than 8 hours/persistent or continuous  Duration (Past 1 Month): More than 8 hours/persistent or continuous  Controllability (Lifetime): Unable to control thoughts  Controllability (Past 1 Month): Unable to control thoughts  Deterrents (Lifetime): Deterrents definitely did not stop you  Deterrents (Past 1 Month): Deterrents definitely did not stop you  Reasons for Ideation (Lifetime): Completely to end or stop the pain (You couldn't go on living with the pain or how you were feeling)  Reasons for Ideation (Past 1 Month): Completely to end or stop the pain (You couldn't go on living with the pain or how you were feeling)  Suicidal Behavior  Actual Attempt (Lifetime): Yes  Actual Attempt (Past 3 Months): Yes  Has subject engaged in non-suicidal self-injurious behavior? (Lifetime): No  Interrupted Attempts (Lifetime): No  Aborted or Self-Interrupted Attempt (Lifetime): No  Preparatory Acts or Behavior (Lifetime): No  C-SSRS Risk (Lifetime/Recent)  Calculated C-SSRS Risk Score (Lifetime/Recent): High Risk    Validity of evaluation is not impacted by presenting factors during interview .   Comments regarding subjective versus objective responses to [place] tool: n/a  Environmental or Psychosocial Events: loss of relationship due to divorce/separation, bullied/abused, work or task failure, challenging  interpersonal relationships, helplessness/hopelessness and impulsivity/recklessness  Chronic Risk Factors: history of suicide attempts (5), history of abuse or neglect and parental mental health issue   Warning Signs: talking or writing about death, dying, or suicide, acting reckless or engaging in risky activities, withdrawing from friends, family, and society, anxiety, agitation, unable to sleep, sleeping all the time and no reason for living, no sense of purpose in life  Protective Factors: strong bond to family unit, community support, or employment, responsibilities and duties to others, including pets and children, lives in a responsibly safe and stable environment, optimistic outlook - identification of future goals and constructive use of leisure time, enjoyable activities, resilience  Interpretation of Risk Scoring, Risk Mitigation Interventions and Safety Plan:  Pt currently scores as high risk due to Pt's reported suicide attempt 3 weeks ago via overdose of 3 percocet and ETOH.  Although Pt reports suicidal ideation, Pt reports that her ideation has since reduced to baseline.  Pt reports that she chronically for years has experienced ideation with on going thoughts of methods.  Pt denies any current intent to harm herself.  Furthermore, means restriction has been completed and verified via Pt's mother whom she lives with.  Pt's family including mother, sister, and step father will assist in safety and supervision.  Pt is presents as help seeking and appts have been set up for aftercare.   PT denies any self harm. Pt denies any current homicidal ideation, intent or planning.  PT is able to engage in safety planning. Pt appears future and goal oriented.  PT is able to engage in a discussion about their protective factors.         Does the patient have thoughts of harming others? No     Is the patient engaging in sexually inappropriate behavior?  no        Current Substance Abuse     Is there recent  substance abuse? no     Was a urine drug screen or blood alcohol level obtained: No       Mental Status Exam     Affect: Appropriate and Flat   Appearance: Appropriate    Attention Span/Concentration: Attentive  Eye Contact: Engaged   Fund of Knowledge: Appropriate    Language /Speech Content: Fluent   Language /Speech Volume: Soft    Language /Speech Rate/Productions: Articulate    Recent Memory: Intact   Remote Memory: Intact   Mood: Anxious, Depressed and Sad    Orientation to Person: Yes    Orientation to Place: Yes   Orientation to Time of Day: Yes    Orientation to Date: Yes    Situation (Do they understand why they are here?): Yes    Psychomotor Behavior: Normal    Thought Content: Clear and Suicidal   Thought Form: Intact      History of commitment: No       Medication    Psychotropic medications: Yes. Pt is currently taking effexor. Medication compliant: Yes. Recent medication changes: Yes recent increase  Medication changes made in the last two weeks: No       Current Care Team    Primary Care Provider: [place]  Psychiatrist: No  Therapist: No  : No     CTSS or ARMHS: No  ACT Team: No  Other: No      Diagnosis    311 (F32.9) Unspecified Depressive Disorder   300.00 (F41.9) Unspecified Anxiety Disorder  309.9 (F43.9) Unspecified Trauma and Stressor Related Disorder   R/o borderline personality disorder    Clinical Summary and Substantiation of Recommendations    Although Pt reports suicidal ideation, Pt reports that her ideation has since reduced to baseline.  Pt reports that she chronically for years has experienced ideation with on going thoughts of methods.  Pt denies any current intent to harm herself.  Furthermore, means restriction has been completed and verified via Pt's mother whom she lives with.  Pt's family including mother, sister, and step father will assist in safety and supervision.  Pt is presents as help seeking and appts have been set up for aftercare.   PT denies any self  harm. Pt denies any current homicidal ideation, intent or planning.  PT is able to engage in safety planning. Pt appears future and goal oriented.  PT is able to engage in a discussion about their protective factors.  PT does not currently appear to be in need of acute stabilization for overt psychosis, britany, delusional thinking or paranoia.  PT is appropriate to transition into outpatient community services at this time.  Writer offered observation to Pt as well as inpatient for further stabilization of which she declined.  At this time, per attending provider Pt does not meet criteria for an involuntary hold, therefore Pt will be discharged.    At the time of discharge, the patient's acute suicide risk was determined to be low due to the following factors: Reduction in the intensity of mood/anxiety symptoms that preceded the admission, denial of suicidal thoughts, denies feeling helpless or helpless, not currently under the influence of alcohol or illicit substances, denies experiencing command hallucinations, no immediate access to firearms. The patient's acute risk could be higher if noncompliant with their treatment plan, medications, follow-up appointments or using illicit substances or alcohol. Protective factors include: social supports, stable housing     Disposition    Recommended disposition: Individual Therapy and Medication Management       Reviewed case and recommendations with attending provider. Attending Name: Yudelka PEÑA       Attending concurs with disposition: Yes       Patient and/or validated legal guardian concurs with disposition: Yes       Final disposition: Individual therapy  and Medication management.     Outpatient Details (if applicable):   Aftercare plan and appointments placed in the AVS and provided to patient: Yes. Given to patient by RN    Was lethal means counseling provided as a part of aftercare planning? Yes - describe Writer confirmed with Pt's mother, Mary at  who  has removed all of the medication from the home.  They will be also removing the firearms that Oksana doesn't even know exist (that are for hunting and in the attic).  They will also have a conversation with her brother who is a  to ensure she is not around her firearms.      Assessment Details    Patient interview started at: 1930 and completed at: 2030.     Total time spent with the patient or their family: 1.0 hrs      CPT code(s) utilized: 29772 - Psychotherapy for Crisis - 60 (30-74*) min       Hilary Cano [facility],  Psychotherapist  [facility] - Triage & Transition Services  Callback: [phone]    You were scheduled the following appointments:    Cristy Gerber  PhD  [facility], [address], [address]   [phone]   7/13/2023 1:00 pm     Therapy - Initial (In-Person)    Psychiatry:    Janeil Schoenbauer  MSN  CNP,RN     [address] [phone] 7/21/2023 1:00 pm     Medication Mgmt - Initial (In-Person)    Patient Instructions    Please have intake paperwork completed at least 2 days prior to your appointment.If you are doing a tele-health appointment, the link is [email or web address] from there you will be able to select the provider you are meeting with and it will direct you to your appointment.    It is your responsibility to contact your insurance company directly to verify coverage, eligibility, payment, and benefit information for any appointments or referrals listed above. [facility] maintains an extensive network of licensed behavioral health providers to connect patients with the services they need. We do not charge providers a fee to participate in our referral network. We match patients with providers based on a patient's specific treatment needs, insurance coverage, and location. Our first effort will be to refer you to a provider within your care system, and will utilize providers outside your care system  as needed    Aftercare Plan    Follow up with established providers and supports as scheduled. Continue taking medications as prescribed. Abstain from drugs and alcohol. Utilize your [facility] mental health crisis team as needed. They are available 24/7. Contact information is listed below.     If I am feeling unsafe or I am in a crisis, I will:   Contact my established care providers   Call the [place] Suicide Prevention Lifeline: [phone]   Go to the nearest emergency room   Call 911     Warning signs that I or other people might notice when a crisis is developing for me: changes to sleep, appetite or mood, increased anger, agitation or irritability, feeling depressed or hopeless, spending more time alone or talking less, increased crying, decreased productivity, seeing or hearing things that aren't there, thoughts of not wanting to live anymore or of actually killing myself, thoughts of hurting others, being quiet and negative    Things I am able to do on my own to cope or help me feel better: watching a favorite tv show or movie, listening to music I enjoy, going outside and breathing fresh air, going for a walk or exercising, taking a shower or bath, a cold or hot beverage, a healthy snack, drawing/coloring/painting, journaling, singing or dancing, deep breathing     I can try practicing square breathing when I begin to feel anxious - inhale through the nose for the count of 4 and the first line on the square. Exhale through the mouth for the count of 4 for the second line of the square. Repeat to complete the square. Repeat the square as many times as needed.    I can also use my five senses to practice mindfulness and grounding. What are five things I can see, four things I can hear, three things I can feel, two things I can smell, and one thing I can taste.     Things that I am able to do with others to cope or help me feel better: sometimes just talking or spending time with someone else, sharing a meal  or having coffee, watching a movie or playing a game, going for a walk or exercising    I can also use community resources including mental health hotlines, [facility] crisis teams, or apps.     Things I can use or do for distraction: movies/tv, music, reading, games, drawing/coloring/painting or other art, essential oils, exercise, cleaning/organizing, puzzles, crossword puzzles, word search, Sudoku       I can also download a meditation or relaxation radha, like Calm, Headspace, or Insight Timer (all three offer a free version)    A great website resource is Change to Chill with active coping skills    Changes I can make to support my mental health and wellness: Attend scheduled mental health therapy and psychiatric appointments. Take my medications as prescribed. Maintain a daily schedule/routine. Abstain from all mood altering substances, including drugs, alcohol, or medications not currently prescribed to me. Implement a self-care routine.      People in my life that I can ask for help: friends or family, trusted teachers/staff/colleagues, trusted members of my community or place of Hindu, mental health crisis lines, or 911    Your [facility] has a mental health crisis team you can call 24/7: [facility], [phone]    Other things that are important when I m in crisis: to remember that the feelings I am having right now are temporary, and it won't feel like this forever, and that it is okay and important to ask for help, asking my family for support    Crisis Lines  Crisis Text Line  Text 994175  You will be connected with a trained live crisis counselor to provide support.    Por espanol, texto  MARTITA a 609198 o texto a 442-AYUDAME en WhatsApp    [place] Hope Line  1.800.SUICIDE [4247187]      Community Resources  Fast Tracker  Linking people to mental health and substance use disorder resources  fasttrackermn.org     [place] Mental Health Warm Line  Peer to peer support  Monday thru Saturday, 12 pm  "to 10 pm  439.335.1411 or 6.482.384.1417  Text \"Support\" to 66992    National [place] on Mental [employer]ness (MARVIN)  097.668.2557 or 1.888.MARVIN.HELPS      Mental Health Apps  My3  https://Rheonixpp.org/    VirtualHopeBox  https://[employer]/apps/virtual-hope-box/      Additional Information  Today you were seen by a licensed mental health professional through Triage and Transition services, Behavioral Healthcare Providers ([facility])  for a crisis assessment in the Emergency Department at [facility].  It is recommended that you follow up with your established providers (psychiatrist, mental health therapist, and/or primary care doctor - as relevant) as soon as possible. Coordinators from [facility] will be calling you in the next 24-48 hours to ensure that you have the resources you need.  You can also contact [facility] coordinators directly at [phone]. You may have been scheduled for or offered an appointment with a mental health provider. [facility] maintains an extensive network of licensed behavioral health providers to connect patients with the services they need.  We do not charge providers a fee to participate in our referral network.  We match patients with providers based on a patient's specific needs, insurance coverage, and location.  Our first effort will be to refer you to a provider within your care system, and will utilize providers outside your care system as needed.              "

## 2023-07-06 NOTE — PROGRESS NOTES
Patient agreeable to discharge plan. Discharge instructions reviewed with patient including follow-up care plan. Medications reviewed. Reviewed safety plan and outpatient resources. Denies SI and HI. All belongings that were brought into the hospital have been returned to patient. Escorted off the unit at 2140 accompanied by [facility] staff. Discharged to home via private transportation.

## 2023-07-06 NOTE — PROGRESS NOTES
Per ED report:  Oksana Fraga is a 23 year old female with a history of anxiety who presents for evaluation of suicidal ideation. Oksana reported having mental health issues since she was 16 years old. She has never had a psychiatrist but has done therapy and stated having bad experiences with it. Recently, Oksana has been having depressive episodes and had a suicide attempt three weeks ago. With that attempt, she took percocet along with alcohol which resulting to her not being able to breathe afterwards.   [facility] and cooperative. Pt still endorses passive SI but she verbally contracts for safety. She denies any drug use. She denies AV/H.   Nursing and risk assessments completed. Assessments reviewed with [facility]HP and physician. Admission information reviewed with patient. Patient given a tour of [facility] and instructions on using the facility. Questions regarding [facility] addressed. Pt safety search completed.

## 2023-07-10 ENCOUNTER — OFFICE VISIT (OUTPATIENT)
Dept: FAMILY MEDICINE | Facility: CLINIC | Age: 23
End: 2023-07-10
Payer: COMMERCIAL

## 2023-07-10 VITALS
TEMPERATURE: 97 F | DIASTOLIC BLOOD PRESSURE: 60 MMHG | RESPIRATION RATE: 16 BRPM | OXYGEN SATURATION: 98 % | BODY MASS INDEX: 18.25 KG/M2 | HEIGHT: 67 IN | SYSTOLIC BLOOD PRESSURE: 98 MMHG | WEIGHT: 116.3 LBS | HEART RATE: 67 BPM

## 2023-07-10 DIAGNOSIS — F41.9 ANXIETY: Primary | ICD-10-CM

## 2023-07-10 DIAGNOSIS — F33.3 SEVERE EPISODE OF RECURRENT MAJOR DEPRESSIVE DISORDER, WITH PSYCHOTIC FEATURES (H): ICD-10-CM

## 2023-07-10 PROCEDURE — 99213 OFFICE O/P EST LOW 20 MIN: CPT | Performed by: NURSE PRACTITIONER

## 2023-07-10 RX ORDER — VENLAFAXINE HYDROCHLORIDE 75 MG/1
75 CAPSULE, EXTENDED RELEASE ORAL DAILY
Qty: 90 CAPSULE | Refills: 0 | Status: SHIPPED | OUTPATIENT
Start: 2023-07-10

## 2023-07-10 RX ORDER — PROMETHAZINE HYDROCHLORIDE 12.5 MG/1
12.5 TABLET ORAL EVERY 4 HOURS PRN
COMMUNITY
Start: 2023-05-22 | End: 2023-07-10

## 2023-07-10 RX ORDER — DOXYCYCLINE 100 MG/1
CAPSULE ORAL
COMMUNITY
Start: 2023-05-01 | End: 2023-07-10

## 2023-07-10 RX ORDER — TRETINOIN 0.025 %
CREAM (GRAM) TOPICAL
COMMUNITY
Start: 2023-06-19

## 2023-07-10 RX ORDER — OXYCODONE AND ACETAMINOPHEN 5; 325 MG/1; MG/1
1 TABLET ORAL EVERY 6 HOURS PRN
COMMUNITY
Start: 2023-05-22 | End: 2023-07-10

## 2023-07-10 RX ORDER — CLINDAMYCIN PHOSPHATE 10 UG/ML
LOTION TOPICAL
COMMUNITY
Start: 2023-03-24 | End: 2023-10-30

## 2023-07-10 ASSESSMENT — ANXIETY QUESTIONNAIRES
IF YOU CHECKED OFF ANY PROBLEMS ON THIS QUESTIONNAIRE, HOW DIFFICULT HAVE THESE PROBLEMS MADE IT FOR YOU TO DO YOUR WORK, TAKE CARE OF THINGS AT HOME, OR GET ALONG WITH OTHER PEOPLE: SOMEWHAT DIFFICULT
6. BECOMING EASILY ANNOYED OR IRRITABLE: SEVERAL DAYS
2. NOT BEING ABLE TO STOP OR CONTROL WORRYING: NOT AT ALL
GAD7 TOTAL SCORE: 1
3. WORRYING TOO MUCH ABOUT DIFFERENT THINGS: NOT AT ALL
4. TROUBLE RELAXING: NOT AT ALL
1. FEELING NERVOUS, ANXIOUS, OR ON EDGE: NOT AT ALL
5. BEING SO RESTLESS THAT IT IS HARD TO SIT STILL: NOT AT ALL
7. FEELING AFRAID AS IF SOMETHING AWFUL MIGHT HAPPEN: NOT AT ALL
GAD7 TOTAL SCORE: 1

## 2023-07-10 ASSESSMENT — PATIENT HEALTH QUESTIONNAIRE - PHQ9
SUM OF ALL RESPONSES TO PHQ QUESTIONS 1-9: 18
SUM OF ALL RESPONSES TO PHQ QUESTIONS 1-9: 18
10. IF YOU CHECKED OFF ANY PROBLEMS, HOW DIFFICULT HAVE THESE PROBLEMS MADE IT FOR YOU TO DO YOUR WORK, TAKE CARE OF THINGS AT HOME, OR GET ALONG WITH OTHER PEOPLE: VERY DIFFICULT

## 2023-07-10 ASSESSMENT — PAIN SCALES - GENERAL: PAINLEVEL: NO PAIN (0)

## 2023-07-10 NOTE — PROGRESS NOTES
Assessment and Plan:       ICD-10-CM    1. Anxiety  F41.9 venlafaxine (EFFEXOR XR) 75 MG 24 hr capsule      2. Severe episode of recurrent major depressive disorder, with psychotic features (H)  F33.3         Discussed treatment options.  We will decrease dose of venlafaxine to 75 mg daily.  Patient has crisis numbers.  She has appointment approaching with psychology and psychiatry.  She feels well supported at this time.  She is content with the plan.    Subjective:     Oksana is a 23 year old female presenting to the clinic for follow-up on emergency room evaluation.  Patient presented to the emergency room on 7/5/2023 with concerns of suicidal ideation.  Patient attempted suicide approximately 3-4 weeks ago by drinking tequila and taking Percocet.  She disclosed this to the family and they brought her to the ER.  Patient admits to suicidal thoughts intermittently since age of 16.  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  Patient was taking venlafaxine .5 mg daily.  Patient states the increase caused her to feel numb.  She would like to lower the dose today.  She admits to difficulty trusting others and does not open up to friends and family about her emotions.  She does feel supported.  Her mother, aunt, and sisters have anxiety depression.  She is concerned of underlying bipolar disorder as she is impulsive and has mood swings.  She has a psychology appointment on 7/13/2023 and a psychiatry appointment on 7/21/2023.    Reviewof Systems: A complete 14 point review of systems was obtained and is negative or as stated in the history of present illness.    Social History     Socioeconomic History     Marital status: Single     Spouse name: Not on file     Number of children: Not on file     Years of education: Not on file     Highest education level: Not on file   Occupational History     Not on file   Tobacco Use     Smoking status: Some  "Days     Types: Vaping Device     Smokeless tobacco: Never   Vaping Use     Vaping Use: Every day     Substances: Nicotine     Devices: Disposable   Substance and Sexual Activity     Alcohol use: Yes     Comment: rare     Drug use: Yes     Types: Marijuana     Sexual activity: Never     Comment: single    Other Topics Concern     Not on file   Social History Narrative     Not on file     Social Determinants of Health     Financial Resource Strain: Not on file   Food Insecurity: Not on file   Transportation Needs: Not on file   Physical Activity: Not on file   Stress: Not on file   Social Connections: Not on file   Intimate Partner Violence: Not on file   Housing Stability: Not on file       Active Ambulatory Problems     Diagnosis Date Noted     Anxiety 10/22/2019     Moderate episode of recurrent major depressive disorder (H) 04/03/2023     Resolved Ambulatory Problems     Diagnosis Date Noted     No Resolved Ambulatory Problems     No Additional Past Medical History       Family History   Problem Relation Age of Onset     Anemia Mother      No Known Problems Father      Heart Disease Maternal Grandfather        Objective:     BP 98/60   Pulse 67   Temp 97  F (36.1  C)   Resp 16   Ht 1.695 m (5' 6.73\")   Wt 52.8 kg (116 lb 4.8 oz)   LMP 06/20/2023 (Exact Date)   SpO2 98%   BMI 18.36 kg/m      Patient is alert, in no obvious distress.   Skin: Warm, dry.   Lungs:  Clear to auscultation. Respirations even and unlabored.  No wheezing or rales noted.   Heart:  Regular rate and rhythm.  No murmurs.                 "

## 2023-07-21 ENCOUNTER — TRANSFERRED RECORDS (OUTPATIENT)
Dept: HEALTH INFORMATION MANAGEMENT | Facility: CLINIC | Age: 23
End: 2023-07-21
Payer: COMMERCIAL

## 2023-07-26 ENCOUNTER — E-VISIT (OUTPATIENT)
Dept: URGENT CARE | Facility: CLINIC | Age: 23
End: 2023-07-26
Payer: COMMERCIAL

## 2023-07-26 DIAGNOSIS — N39.0 ACUTE UTI (URINARY TRACT INFECTION): Primary | ICD-10-CM

## 2023-07-26 PROCEDURE — 99207 PR NON-BILLABLE SERV PER CHARTING: CPT | Performed by: NURSE PRACTITIONER

## 2023-07-26 RX ORDER — NITROFURANTOIN 25; 75 MG/1; MG/1
100 CAPSULE ORAL 2 TIMES DAILY
Qty: 10 CAPSULE | Refills: 0 | Status: SHIPPED | OUTPATIENT
Start: 2023-07-26 | End: 2023-07-31

## 2023-07-26 NOTE — PATIENT INSTRUCTIONS
Dear Oksana Fraga    After reviewing your responses, I've been able to diagnose you with a urinary tract infection, which is a common infection of the bladder with bacteria.  This is not a sexually transmitted infection, though urinating immediately after intercourse can help prevent infections.  Drinking lots of fluids is also helpful to clear your current infection and prevent the next one.      I have sent a prescription for antibiotics to your pharmacy to treat this infection.    It is important that you take all of your prescribed medication even if your symptoms are improving after a few doses.  Taking all of your medicine helps prevent the symptoms from returning.     If your symptoms worsen, you develop pain in your back or stomach, develop fevers, or are not improving in 5 days, please contact your primary care provider for an appointment or visit any of our convenient Walk-in or Urgent Care Centers to be seen, which can be found on our website here.    Thanks again for choosing us as your health care partner,    HAYDEN Roblero CNP    Urinary Tract Infections in Women  Urinary tract infections (UTIs) are most often caused by bacteria. These bacteria enter the urinary tract. The bacteria may come from inside the body. Or they may travel from the skin outside the rectum or vagina into the urethra. Female anatomy makes it easy for bacteria from the bowel to enter a woman s urinary tract, which is the most common source of UTI. This means women develop UTIs more often than men. Pain in or around the urinary tract is a common UTI symptom. But the only way to know for sure if you have a UTI for the healthcare provider to test your urine. The two tests that may be done are the urinalysis and urine culture.    Types of UTIs    Cystitis. A bladder infection (cystitis) is the most common UTI in women. You may have urgent or frequent need to pee. You may also have pain, burning when you pee, and bloody  urine.    Urethritis. This is an inflamed urethra, which is the tube that carries urine from the bladder to outside the body. You may have lower stomach or back pain. You may also have urgent or frequent need to pee.    Pyelonephritis. This is a kidney infection. If not treated, it can be serious and damage your kidneys. In severe cases, you may need to stay in the hospital. You may have a fever and lower back pain.    Medicines to treat a UTI  Most UTIs are treated with antibiotics. These kill the bacteria. The length of time you need to take them depends on the type of infection. It may be as short as 3 days. If you have repeated UTIs, you may need a low-dose antibiotic for several months. Take antibiotics exactly as directed. Don t stop taking them until all of the medicine is gone, even if you feel better. If you stop taking the antibiotic too soon, the infection may not go away. You may also develop a resistance to the antibiotic. This can make it much harder to treat.  Lifestyle changes to treat and prevent UTIs  The lifestyle changes below will help get rid of your UTI. They may also help prevent future UTIs.    Drink plenty of fluids. This includes water, juice, or other caffeine-free drinks. Fluids help flush bacteria out of your body.    Empty your bladder. Always empty your bladder when you feel the urge to pee. And always pee before going to sleep. Urine that stays in your bladder can lead to infection. Try to pee before and after sex as well.    Practice good personal hygiene. Wipe yourself from front to back after using the toilet. This helps keep bacteria from getting into the urethra.    Wear cotton underwear. Don't wear synthetic or tight-fitting underwear that can trap moisture. Change out of wet bathing suits and workout clothing quickly.    Take showers. Showers are better than baths for preventing UTIs.    Use condoms during sex. These help prevent UTIs caused by sexually transmitted bacteria.  Also don't use spermicides during sex. These can increase the risk for UTIs. Choose other forms of birth control instead. For women who tend to get UTIs after sex, a low-dose of a preventive antibiotic may be used. Be sure to discuss this option with your healthcare provider.    Follow up with your healthcare provider as directed. They may test to make sure the infection has cleared. If needed, more treatment may be started.  [employer] last reviewed this educational content on 9/1/2021 2000-2023 [employer]. All rights reserved. This information is not intended as a substitute for professional medical care. Always follow your healthcare professional's instructions.

## 2023-07-29 ENCOUNTER — E-VISIT (OUTPATIENT)
Dept: FAMILY MEDICINE | Facility: CLINIC | Age: 23
End: 2023-07-29
Payer: COMMERCIAL

## 2023-07-29 DIAGNOSIS — N39.0 ACUTE UTI (URINARY TRACT INFECTION): Primary | ICD-10-CM

## 2023-07-29 PROCEDURE — 99421 OL DIG E/M SVC 5-10 MIN: CPT | Performed by: FAMILY MEDICINE

## 2023-07-29 RX ORDER — CIPROFLOXACIN 250 MG/1
250 TABLET, FILM COATED ORAL 2 TIMES DAILY
Qty: 6 TABLET | Refills: 0 | Status: SHIPPED | OUTPATIENT
Start: 2023-07-29 | End: 2023-08-01

## 2023-07-29 NOTE — PATIENT INSTRUCTIONS
Dear Oksana Fraga    After reviewing your responses, I've been able to diagnose you with a urinary tract infection, which is a common infection of the bladder with bacteria.  This is not a sexually transmitted infection, though urinating immediately after intercourse can help prevent infections.  Drinking lots of fluids is also helpful to clear your current infection and prevent the next one.      I have sent a prescription for antibiotics to your pharmacy to treat this infection.    It is important that you take all of your prescribed medication even if your symptoms are improving after a few doses.  Taking all of your medicine helps prevent the symptoms from returning.     If your symptoms worsen, you develop pain in your back or stomach, develop fevers, or are not improving in 5 days, please contact your primary care provider for an appointment or visit any of our convenient Walk-in or Urgent Care Centers to be seen, which can be found on our website here.    Thanks again for choosing us as your health care partner,    Ambrosio Alcazar MD    Urinary Tract Infections in Women  Urinary tract infections (UTIs) are most often caused by bacteria. These bacteria enter the urinary tract. The bacteria may come from inside the body. Or they may travel from the skin outside the rectum or vagina into the urethra. Female anatomy makes it easy for bacteria from the bowel to enter a woman s urinary tract, which is the most common source of UTI. This means women develop UTIs more often than men. Pain in or around the urinary tract is a common UTI symptom. But the only way to know for sure if you have a UTI for the healthcare provider to test your urine. The two tests that may be done are the urinalysis and urine culture.    Types of UTIs  Cystitis. A bladder infection (cystitis) is the most common UTI in women. You may have urgent or frequent need to pee. You may also have pain, burning when you pee, and bloody  urine.  Urethritis. This is an inflamed urethra, which is the tube that carries urine from the bladder to outside the body. You may have lower stomach or back pain. You may also have urgent or frequent need to pee.  Pyelonephritis. This is a kidney infection. If not treated, it can be serious and damage your kidneys. In severe cases, you may need to stay in the hospital. You may have a fever and lower back pain.    Medicines to treat a UTI  Most UTIs are treated with antibiotics. These kill the bacteria. The length of time you need to take them depends on the type of infection. It may be as short as 3 days. If you have repeated UTIs, you may need a low-dose antibiotic for several months. Take antibiotics exactly as directed. Don t stop taking them until all of the medicine is gone, even if you feel better. If you stop taking the antibiotic too soon, the infection may not go away. You may also develop a resistance to the antibiotic. This can make it much harder to treat.  Lifestyle changes to treat and prevent UTIs  The lifestyle changes below will help get rid of your UTI. They may also help prevent future UTIs.  Drink plenty of fluids. This includes water, juice, or other caffeine-free drinks. Fluids help flush bacteria out of your body.  Empty your bladder. Always empty your bladder when you feel the urge to pee. And always pee before going to sleep. Urine that stays in your bladder can lead to infection. Try to pee before and after sex as well.  Practice good personal hygiene. Wipe yourself from front to back after using the toilet. This helps keep bacteria from getting into the urethra.  Wear cotton underwear. Don't wear synthetic or tight-fitting underwear that can trap moisture. Change out of wet bathing suits and workout clothing quickly.  Take showers. Showers are better than baths for preventing UTIs.  Use condoms during sex. These help prevent UTIs caused by sexually transmitted bacteria. Also don't use  spermicides during sex. These can increase the risk for UTIs. Choose other forms of birth control instead. For women who tend to get UTIs after sex, a low-dose of a preventive antibiotic may be used. Be sure to discuss this option with your healthcare provider.  Follow up with your healthcare provider as directed. They may test to make sure the infection has cleared. If needed, more treatment may be started.  [employer] last reviewed this educational content on 9/1/2021 2000-2023 [employer]. All rights reserved. This information is not intended as a substitute for professional medical care. Always follow your healthcare professional's instructions.

## 2023-08-14 ENCOUNTER — MYC MEDICAL ADVICE (OUTPATIENT)
Dept: FAMILY MEDICINE | Facility: CLINIC | Age: 23
End: 2023-08-14
Payer: COMMERCIAL

## 2023-08-14 NOTE — TELEPHONE ENCOUNTER
Writer called patient and left message to return call to clinic. If patient returns call please route to nurse queue to be triaged on symptoms she is having.    BETSY CamarenaN, RN  [facility]

## 2023-08-15 ENCOUNTER — E-VISIT (OUTPATIENT)
Dept: FAMILY MEDICINE | Facility: CLINIC | Age: 23
End: 2023-08-15
Payer: COMMERCIAL

## 2023-08-15 ENCOUNTER — NURSE TRIAGE (OUTPATIENT)
Dept: FAMILY MEDICINE | Facility: CLINIC | Age: 23
End: 2023-08-15

## 2023-08-15 DIAGNOSIS — R35.0 URINARY FREQUENCY: Primary | ICD-10-CM

## 2023-08-15 PROCEDURE — 99207 PR NON-BILLABLE SERV PER CHARTING: CPT | Performed by: NURSE PRACTITIONER

## 2023-08-15 NOTE — TELEPHONE ENCOUNTER
"Patient would like PCP recommendations sent to her via [employer]     Nurse Triage SBAR    Is this a 2nd Level Triage? YES, LICENSED PRACTITIONER REVIEW IS REQUIRED    Situation:   [employer] message from patient,   \"Hello,   I was wondering if I should make an appointment or wait this out? Basically I ve been having bladder frequency issues for I d say a week. I am constantly voiding even if I went a good amount I have to go 5 minutes later more.. my bladder region seems more pronounced when standing. I tested myself with a [employer] pregnancy test to see if maybe that was why but it was negative ( I m only 3 days late on my period). Let me know your thoughts, thank you!\"    Background:   Hx of UTI on 7/29/23  There is a possibility of pregnancy     Assessment:   1. SYMPTOM:       Frequency with urination, more than normal, tender lower abdomen  2. ONSET:       About a week and half ago, was better for few days after completing abx for UTI   3. PAIN:       Lower ab. Tenderness, no pain   4. CAUSE:       Unsure of cause   5. OTHER SYMPTOMS:       Period is different than normal, frequency with urination   6. PREGNANCY:       Took cheap pregnancy test few days ago but negative then, possibility of pregnancy    Protocol Recommended Disposition:   See in Office Today    Recommendation:   Home care advice given  No in clinic appointments available today  Routing to PCP to review to see if she recommends a lab only appointment for pregnancy test and follow up UA/UC or if there are other recommendations      Routed to provider    Does the patient meet one of the following criteria for ADS visit consideration? 16+ years old, with an [facility] PCP     TIP  Providers, please consider if this condition is appropriate for management at one of our Acute and Diagnostic Services sites.     If patient is a good candidate, please use dotphrase <dot>triageresponse and select Refer to ADS to document.        Frequency with urination, more " than normal   Reason for Disposition   Urinating more frequently than usual (i.e., frequency)    Additional Information   Negative: Shock suspected (e.g., cold/pale/clammy skin, too weak to stand, low BP, rapid pulse)   Negative: Sounds like a life-threatening emergency to the triager   Negative: Unable to urinate (or only a few drops) > 4 hours and bladder feels very full (e.g., palpable bladder or strong urge to urinate)   Negative: Decreased urination and drinking very little and dehydration suspected (e.g., dark urine, no urine > 12 hours, very dry mouth, very lightheaded)   Negative: Patient sounds very sick or weak to the triager   Negative: Fever > 100.4 F  (38.0 C)   Negative: Side (flank) or lower back pain present   Negative: Can't control passage of urine (i.e., urinary incontinence) and new-onset (< 2 weeks) or worsening    Answer Assessment - Initial Assessment Questions  1. SYMPTOM:       Frequency with urination, more than normal, tender lower abdomen  2. ONSET:       About a week and half ago, was better for few days after completing abx for UTI   3. PAIN:       Lower ab. Tenderness, no pain   4. CAUSE:       Unsure of cause   5. OTHER SYMPTOMS:       Period is different than normal, frequency with urination   6. PREGNANCY:       Took cheap pregnancy test few days ago but negative then, possibility of pregnancy    Protocols used: Urinary Symptoms-A-[place]    BETSY CamarenaN, RN  [facility]

## 2023-08-15 NOTE — TELEPHONE ENCOUNTER
Adriel sent to patient with provider recommendations for evisit.    Nancy Diego, BSN, RN  [facility]

## 2023-08-15 NOTE — PATIENT INSTRUCTIONS
Dear Oksana Fraga,     After reviewing your responses, I would like you to come in for a urine test to make sure we treat you correctly. This urine test is to evaluate you for a possible urinary tract infection, and should be scheduled for today or tomorrow. Schedule a Lab Only appointment here.     Lab appointments are not available at most locations on the weekends. If no Lab Only appointment is available, you should be seen in any of our convenient Walk-in or Urgent Care Centers, which can be found on our website here.     You will receive instructions with your results in CallmyName once they are available.     If your symptoms worsen, you develop pain in your back or stomach, develop fevers, or are not improving in 5 days, please contact your primary care provider for an appointment or visit a Walk-in or Urgent Care Center to be seen.     Thanks again for choosing us as your health care partner,     HAYDEN Olivier CNP

## 2023-08-16 ENCOUNTER — LAB (OUTPATIENT)
Dept: LAB | Facility: CLINIC | Age: 23
End: 2023-08-16
Payer: COMMERCIAL

## 2023-08-16 DIAGNOSIS — R35.0 URINARY FREQUENCY: ICD-10-CM

## 2023-08-16 LAB
CLUE CELLS: NORMAL
TRICHOMONAS, WET PREP: NORMAL
WBC'S/HIGH POWER FIELD, WET PREP: NORMAL
YEAST, WET PREP: NORMAL

## 2023-08-16 PROCEDURE — 87210 SMEAR WET MOUNT SALINE/INK: CPT

## 2023-09-09 ENCOUNTER — E-VISIT (OUTPATIENT)
Dept: URGENT CARE | Facility: CLINIC | Age: 23
End: 2023-09-09
Payer: COMMERCIAL

## 2023-09-09 DIAGNOSIS — Z20.822 CLOSE EXPOSURE TO 2019 NOVEL CORONAVIRUS: Primary | ICD-10-CM

## 2023-09-09 PROCEDURE — 99421 OL DIG E/M SVC 5-10 MIN: CPT | Performed by: FAMILY MEDICINE

## 2023-09-09 NOTE — PATIENT INSTRUCTIONS
Dear Oksana,    Based on your exposure to COVID-19, we would like to test you for this virus. I have placed an order for this test. The best time for testing is 5 days after the exposure.    How to schedule:  Go to your [employer] home page and scroll down to the section that says  You have an appointment that needs to be scheduled  and click the large green button that says  Schedule Now  and follow the steps to find the next available opening.     If you are unable to complete these [employer] scheduling steps, please call [phone] to schedule your testing.     How and when do I quarantine?  You quarantine when you may have been exposed to the virus and DON T have symptoms.   Stay home and away from others.   Wear a mask for 10 full days any time you re around others.  Get tested at least 5 days after you were exposed, even if you don t have symptoms.   If you start to have symptoms, isolate right away and get tested.    What are the symptoms of COVID-19?  Symptoms can include fever, cough, shortness of breath, chills, headache, muscle pain, sore throat, fatigue, runny or stuffy nose, and loss of taste and smell. Other less common symptoms include nausea, vomiting, or diarrhea (watery stools).    If you develop symptoms, follow these guidelines:  If you're normally healthy: Please start another eVisit.  If you have a serious health problem (like cancer, heart failure, an organ transplant or kidney disease): Call your specialty clinic. Let them know that you might have COVID-19.    Where can I get more information?  [facility] - About COVID-19: [email or web address]   CDC - What to Do If You're Sick: https://www.cdc.gov/coronavirus/2019-ncov/if-you-are-sick/index.html   CDC -  Isolation https://www.cdc.gov/coronavirus/2019-ncov/your-health/isolation.html

## 2023-09-16 DIAGNOSIS — Z30.09 BIRTH CONTROL COUNSELING: ICD-10-CM

## 2023-09-18 RX ORDER — NORGESTIMATE AND ETHINYL ESTRADIOL 7DAYSX3 28
1 KIT ORAL DAILY
Qty: 84 TABLET | Refills: 2 | Status: SHIPPED | OUTPATIENT
Start: 2023-09-18

## 2023-09-20 ENCOUNTER — E-VISIT (OUTPATIENT)
Dept: URGENT CARE | Facility: CLINIC | Age: 23
End: 2023-09-20
Payer: COMMERCIAL

## 2023-09-20 DIAGNOSIS — N39.0 ACUTE UTI (URINARY TRACT INFECTION): Primary | ICD-10-CM

## 2023-09-20 PROCEDURE — 99421 OL DIG E/M SVC 5-10 MIN: CPT | Performed by: NURSE PRACTITIONER

## 2023-09-20 RX ORDER — NITROFURANTOIN 25; 75 MG/1; MG/1
100 CAPSULE ORAL 2 TIMES DAILY
Qty: 10 CAPSULE | Refills: 0 | Status: SHIPPED | OUTPATIENT
Start: 2023-09-20 | End: 2023-09-25

## 2023-09-20 NOTE — PATIENT INSTRUCTIONS
Dear Oksana Fraga    After reviewing your responses, I've been able to diagnose you with a urinary tract infection, which is a common infection of the bladder with bacteria.  This is not a sexually transmitted infection, though urinating immediately after intercourse can help prevent infections.  Drinking lots of fluids is also helpful to clear your current infection and prevent the next one.      I have sent a prescription for antibiotics to your pharmacy to treat this infection.    It is important that you take all of your prescribed medication even if your symptoms are improving after a few doses.  Taking all of your medicine helps prevent the symptoms from returning.     If your symptoms worsen, you develop pain in your back or stomach, develop fevers, or are not improving in 5 days, please contact your primary care provider for an appointment or visit any of our convenient Walk-in or Urgent Care Centers to be seen, which can be found on our website here.    Thanks again for choosing us as your health care partner,    HAYDEN Roblero CNP

## 2023-09-25 DIAGNOSIS — F41.9 ANXIETY: ICD-10-CM

## 2023-09-26 RX ORDER — VENLAFAXINE HYDROCHLORIDE 37.5 MG/1
CAPSULE, EXTENDED RELEASE ORAL
Qty: 90 CAPSULE | Refills: 1 | Status: SHIPPED | OUTPATIENT
Start: 2023-09-26 | End: 2023-11-02

## 2023-10-30 ENCOUNTER — E-VISIT (OUTPATIENT)
Dept: FAMILY MEDICINE | Facility: CLINIC | Age: 23
End: 2023-10-30
Payer: COMMERCIAL

## 2023-10-30 ENCOUNTER — TELEPHONE (OUTPATIENT)
Dept: FAMILY MEDICINE | Facility: CLINIC | Age: 23
End: 2023-10-30
Payer: COMMERCIAL

## 2023-10-30 DIAGNOSIS — M41.9 SCOLIOSIS, UNSPECIFIED SCOLIOSIS TYPE, UNSPECIFIED SPINAL REGION: Primary | ICD-10-CM

## 2023-10-30 PROCEDURE — 99421 OL DIG E/M SVC 5-10 MIN: CPT | Performed by: NURSE PRACTITIONER

## 2023-10-30 NOTE — TELEPHONE ENCOUNTER
Reason for call:  Other     Patient called regarding (reason for call): referral     Additional comments: Patient is calling and stating she put a message in on the 27th to see if she can get a referral for spine doctor she states she is in a lot of pain. Please advise and call patient back when referral is placed please and thank you.    Phone number to reach patient:  Home number on file [phone] (home)    Best Time:  any    Can we leave a detailed message on this number?  YES

## 2023-10-30 NOTE — TELEPHONE ENCOUNTER
Duplicate request. Referral request encounter sent to Samia. Please see that encounter for further details.

## 2023-10-30 NOTE — PATIENT INSTRUCTIONS
Thank you for choosing us for your care. I placed a referral to the spine clinic.  A  should be calling you to help you schedule the appointment.     Caring for Your Back    You are not alone.    Low back pain is very common. Nearly half of all adults will have low back pain in any given year. The good news is that back pain is rarely a danger to your health. Most people can manage their back pain on their own. About half of people start feeling better within two weeks. In 9 out of 10 cases, low back pain goes away or no longer limits daily activity within 6 weeks.     Your outlook is good!     Your symptoms tell us that your low back pain is most likely not a danger to you. Most of the time we will not know the exact cause of low back pain, even if you see a doctor or have an MRI. However, treatment can still work without knowing the cause of the pain. Less than 1 in 100 people need surgery for their back pain.     What can I do about my low back pain?     There are three basic things you can do to ease low back pain and help it go away.     Use heat or cold packs.    Take medicine as directed.    Use positions, movements and exercises.    Using heat or cold packs:    Try cold packs or gentle heat to ease your pain.  Use whichever gives you the most relief. Apply the cold pack or heat for 15 minutes at a time, as often as needed.    Taking medicine:    If taking over-the-counter medicine:    Take ibuprofen (Advil, Motrin) 600 mg three times a day as needed for pain.  OR    Take Aleve (naproxen) 220 to 440 mg two times a day as needed for pain. If your doctor prescribed a muscle relaxant (cyclobenzaprine 10 mg.):    Take   to 1 tablet at bedtime.    Do not drive when taking this medicine. This drug may make you sleepy.     Using positions, movements and exercises:    Research tells us that moving your joints and muscles can help you recover from back pain. Such activity should be simple and gentle. Use the  positions below as well as walking to help relieve your pain. Try taking a short walk every 3 to 4 hours during the day. Walk for a few minutes inside your home or take longer walks outside, on a treadmill or at a mall. Slowly increase the amount of time you walk. Expect discomfort when you begin, but it should lessen as your back starts to heal. When your back feels better, walk daily to keep your back and body healthy.    Finding a position that is comfortable:    When your back pain is new, certain positions will ease your pain. Gently try each of the positions below until you find one that is helpful. Once you find a position of comfort, use it as often as you like when you are resting. You will recover faster if you combine rest with activity.    * Lie on your back with your legs bent. You can do this by placing a pillow under your knees or lie on the floor and rest your lower legs on the seat of a chair.  * Lie on your side with your knees bent and place a pillow between your knees.    Lie on your stomach over pillows.       When should I call my doctor?    Your back pain should improve over the first couple of weeks. As it improves, you should be able to return to your normal activities.  But call your doctor if:      You have a sudden change in your ability to control your bladder or bowels.    You begin to feel tingling in your groin or legs.    The pain spreads down your leg and into your foot.    Your toes, feet or leg muscles begin to feel weak.    You feel generally unwell or sick.    Your pain gets worse.    If you are deaf or hard of hearing, please let us know. We provide many free services including sign language interpreters, oral interpreters, TTYs, telephone amplifiers, note takers and written materials.    For informational purposes only. Not to replace the advice of your health care provider. Copyright   2013 [employer]. All rights reserved. [employer] [id number] - 04/13.

## 2023-11-02 ENCOUNTER — E-VISIT (OUTPATIENT)
Dept: FAMILY MEDICINE | Facility: CLINIC | Age: 23
End: 2023-11-02
Payer: COMMERCIAL

## 2023-11-02 DIAGNOSIS — R30.0 DYSURIA: Primary | ICD-10-CM

## 2023-11-02 PROCEDURE — 99421 OL DIG E/M SVC 5-10 MIN: CPT | Performed by: NURSE PRACTITIONER

## 2023-11-02 NOTE — PATIENT INSTRUCTIONS
Dear Oksana Fraga,     After reviewing your responses, I would like you to come in for a urine test to make sure we treat you correctly. This urine test is to evaluate you for a possible urinary tract infection, and should be scheduled for today or tomorrow. Schedule a Lab Only appointment here.     Lab appointments are not available at most locations on the weekends. If no Lab Only appointment is available, you should be seen in any of our convenient Walk-in or Urgent Care Centers, which can be found on our website here.     You will receive instructions with your results in [employer] once they are available.     If your symptoms worsen, you develop pain in your back or stomach, develop fevers, or are not improving in 5 days, please contact your primary care provider for an appointment or visit a Walk-in or Urgent Care Center to be seen.     Thanks again for choosing us as your health care partner,     HAYDEN Olivier CNP

## 2024-07-21 ENCOUNTER — HEALTH MAINTENANCE LETTER (OUTPATIENT)
Age: 24
End: 2024-07-21

## 2024-08-01 NOTE — TELEPHONE ENCOUNTER
Returned call, mother confirmed message.  Encouraged [employer] video visit, she agreed. Says that the patient was set up, but due to getting a new phone she would need to reactivate.  Says that she will call back once [employer] is activated, transferred call to technical support.   10.9   13.03 )-----------( 239      ( 01 Aug 2024 09:20 )             35.6   08-01    141  |  107  |  9   ----------------------------<  106<H>  3.9   |  28  |  0.79    Ca    8.5      01 Aug 2024 09:20    TPro  7.3  /  Alb  3.5  /  TBili  0.4  /  DBili  x   /  AST  12  /  ALT  27  /  AlkPhos  87  08-01  < from: CT Abdomen and Pelvis w/ IV Cont (08.01.24 @ 10:16) >    IMPRESSION:  Acuteappendicitis.    Small pelvic free fluid, probably physiologic for age.    4.7 cm hypodense structure in the right adnexa may represent a right   ovarian cyst. If clinically indicated, pelvic ultrasound may be pursued   for further evaluation.    ---End of Report ---    < end of copied text >

## 2025-08-10 ENCOUNTER — HEALTH MAINTENANCE LETTER (OUTPATIENT)
Age: 25
End: 2025-08-10